# Patient Record
Sex: FEMALE | Race: ASIAN | NOT HISPANIC OR LATINO | Employment: PART TIME | URBAN - METROPOLITAN AREA
[De-identification: names, ages, dates, MRNs, and addresses within clinical notes are randomized per-mention and may not be internally consistent; named-entity substitution may affect disease eponyms.]

---

## 2017-03-03 ENCOUNTER — COMMUNICATION - HEALTHEAST (OUTPATIENT)
Dept: HEALTH INFORMATION MANAGEMENT | Facility: CLINIC | Age: 37
End: 2017-03-03

## 2021-06-01 ENCOUNTER — RECORDS - HEALTHEAST (OUTPATIENT)
Dept: ADMINISTRATIVE | Facility: CLINIC | Age: 41
End: 2021-06-01

## 2021-06-02 ENCOUNTER — RECORDS - HEALTHEAST (OUTPATIENT)
Dept: ADMINISTRATIVE | Facility: CLINIC | Age: 41
End: 2021-06-02

## 2021-11-04 NOTE — TELEPHONE ENCOUNTER
FUTURE VISIT INFORMATION      FUTURE VISIT INFORMATION:    Date: 12/3/21    Time: 8:30 AM    Location: [facility]  REFERRAL INFORMATION:    Referring provider: Dr. Gale Kim    Referring providers clinic: [facility] [place]    Reason for visit/diagnosis: Osteoma of the paranasal sinuses    RECORDS REQUESTED FROM:       Clinic name Comments Records Status Imaging Status   [facility] [place] 11/4/21 - ENT OV with Dr. Kim Care Everywhere    [facility] [place] 10/6/21 - PCC TV with Dr. Leone Care Everywhere    [facility] - Imaging 10/18/21 - CT Head Sinus Care Everywhere 11/4 Req - In PACs                       * 11/4/21 2:47 PM Faxed req to [facility] for images to be pushed to [employer] PACs. - Sidra

## 2021-11-05 ENCOUNTER — TRANSCRIBE ORDERS (OUTPATIENT)
Dept: OTHER | Age: 41
End: 2021-11-05

## 2021-11-05 DIAGNOSIS — D16.4 OSTEOMA OF PARANASAL SINUS: Primary | ICD-10-CM

## 2021-11-30 NOTE — PATIENT INSTRUCTIONS
"1. You were seen in the clinic today by Dr. Garcia. If you have any questions or concerns after your appointment, please call the clinic at [phone]. Press \"1\" for scheduling, press \"3\" for nurse advice.    2.   The following has been recommended for you based upon your appointment today:   -Schedule for surgery for the [place] for osteoma.    3.   Plan to return the clinic after surgery.       Michelle Griffin LPN  [facility]  Department of Otolaryngology  [phone]    Surgery Teaching    1. Someone from our scheduling department will call you within the next few days to get you scheduled with your provider for surgery. If no one has called you in one week, please notify us.    2. You must have a physical exam (called  history and physical ) within 30 days of surgery. You may complete this with your primary care provider.   A. If your provider is outside of the [facility] please have them complete the preoperative forms provided to you in the surgery packet you will be mailed and be sure to have your provider fax them to the appropriate location prior to surgery. For surgery at the [facility] the fax number [phone]. For surgery at the [place] the fax number is [phone].  B. In some cases we may have you see our Preoperative Assessment Center. If we have expressed this to you, our  will set up your appointment with them when they call to set up your surgery.    3. Complete a COVID test 4 days prior to surgery. You will need to have this done regardless of whether you have had the COVID vaccine. If you have the test performed at a clinic outside of the network, you will need to have the test results faxed to us.    4. For same-day surgery, you must arrange for an adult to take you home from the Center. An adult must stay with you for the first 24 hours after surgery. You cannot drive for 24 hours.     5. Ask your doctor what medicines are safe before surgery. For over the counter " medications and supplements it is advised that you do NOT TAKE MOTRIN, IBUPROFEN, ASPIRIN, ALEVE, GARLIC SUPPLEMENTS or FISH OIL x 7 days prior to surgery (to prevent excess bleeding and bruising at time of surgery). If your provider advises you to take any medication the morning of surgery you should take this with a sip of water.    6. A few days prior to surgery a nurse will call you to review your health history and instructions for before and after surgery. They will give you your final arrival time based upon your scheduled arrival time for surgery.    7. Call the surgical team if there's any change in your health prior to surgery. Things you should call for include but are not limited to signs of a cold or the flu (sore throat, runny nose, cough, rash, fever). Other things to notify them for is for any open wounds (cuts, scrapes, scratches) near to the surgery site.    8. If you drink alcohol, stop drinking alcohol at least 24 hours before surgery.    9. If you smoke, stop or at least cut down on smoking 24 hours before surgery.    10.Take a bath or shower the night before and the morning of surgery (as told by your surgeon). Use an antiseptic soap. If your doctor does not give you special soap, buy Hibiclens or Francie-Stat at the drug store or ask the pharmacist to suggest a brand. You will wash with this from the neck down, washing your hair and face as you would normally.   A. When you are done with your shower please be sure to use clean towels to dry with, have clean linens on your bed, and put on clean clothes each time.   B. DO NOT put on lotion, powder, perfume, deodorant or make-up after bathing.    11. You can eat a normal meal the night before surgery. Do not eat any solid foods or drink any milk products for 8 hours before surgery.     12. You may drink clear liquids until 2 hours before surgery. Clear liquids include water, Gatorade, apple juice and liquids you can see through.    13. No eating or  drinking 2 hours prior to surgery until after surgery. Your post op team will review any diet limitations you might have and when you can start eating and drinking again after surgery.      If you have any questions before or after surgery please call:    VIOLA Rincon  [facility]  Department of Otolaryngology  [phone]

## 2021-12-03 ENCOUNTER — OFFICE VISIT (OUTPATIENT)
Dept: OTOLARYNGOLOGY | Facility: CLINIC | Age: 41
End: 2021-12-03
Payer: COMMERCIAL

## 2021-12-03 ENCOUNTER — PRE VISIT (OUTPATIENT)
Dept: OTOLARYNGOLOGY | Facility: CLINIC | Age: 41
End: 2021-12-03

## 2021-12-03 VITALS — BODY MASS INDEX: 33.49 KG/M2 | WEIGHT: 182 LBS | HEART RATE: 79 BPM | OXYGEN SATURATION: 96 % | HEIGHT: 62 IN

## 2021-12-03 DIAGNOSIS — J32.1 CHRONIC FRONTAL SINUSITIS: ICD-10-CM

## 2021-12-03 DIAGNOSIS — G50.1 ATYPICAL FACIAL PAIN: ICD-10-CM

## 2021-12-03 DIAGNOSIS — D16.4 OSTEOMA OF PARANASAL SINUS: Primary | ICD-10-CM

## 2021-12-03 PROCEDURE — 31231 NASAL ENDOSCOPY DX: CPT | Performed by: OTOLARYNGOLOGY

## 2021-12-03 PROCEDURE — 99204 OFFICE O/P NEW MOD 45 MIN: CPT | Mod: 25 | Performed by: OTOLARYNGOLOGY

## 2021-12-03 RX ORDER — TRIAMCINOLONE ACETONIDE 1 MG/G
CREAM TOPICAL DAILY PRN
COMMUNITY
Start: 2021-10-06

## 2021-12-03 RX ORDER — ESCITALOPRAM OXALATE 20 MG/1
TABLET ORAL EVERY MORNING
COMMUNITY
Start: 2021-10-06

## 2021-12-03 RX ORDER — LISINOPRIL 20 MG/1
20 TABLET ORAL EVERY MORNING
COMMUNITY
Start: 2021-10-06

## 2021-12-03 RX ORDER — ALBUTEROL SULFATE 90 UG/1
1-2 AEROSOL, METERED RESPIRATORY (INHALATION) EVERY 4 HOURS PRN
COMMUNITY
Start: 2021-01-28

## 2021-12-03 RX ORDER — ONDANSETRON 4 MG/1
TABLET, FILM COATED ORAL EVERY 8 HOURS PRN
COMMUNITY

## 2021-12-03 RX ORDER — CLOTRIMAZOLE 1 %
CREAM (GRAM) TOPICAL DAILY PRN
COMMUNITY
Start: 2021-10-06

## 2021-12-03 RX ORDER — GLIPIZIDE 10 MG/1
10 TABLET, FILM COATED, EXTENDED RELEASE ORAL EVERY MORNING
COMMUNITY
Start: 2021-10-06

## 2021-12-03 RX ORDER — FLUTICASONE PROPIONATE 50 MCG
1 SPRAY, SUSPENSION (ML) NASAL DAILY PRN
COMMUNITY

## 2021-12-03 ASSESSMENT — MIFFLIN-ST. JEOR: SCORE: 1443.8

## 2021-12-03 ASSESSMENT — PAIN SCALES - GENERAL: PAINLEVEL: NO PAIN (0)

## 2021-12-03 NOTE — LETTER
12/3/2021       RE: Shauna Mobley  [address] [place] [address]     Dear Colleague,    Thank you for referring your patient, Shauna Mobley, to the [facility] [place] [facility]. Please see a copy of my visit note below.      [facility]  New Patient Visit      Encounter date:   December 3, 2021    Referring Provider:   Dr. Gale Alexis    Chief Complaint: osteoma of the paranasal sinuses    History of Present Illness:   Shauna Mobley is a 41 year old female who presents for consultation regarding osteoma of the paranasal sinuses. The patient initially had symptoms of sharp facial pain on her face when air would hit her face in August 2020. In September 2021, she reported history of dizziness, ear pain, congestion, and pressure behind her eyes. The patient presented to her PCP with ongoing sinus pressure somewhat improved with antibiotics and not improved with sudafed.. She was referred to Dr. Kim for ongoing symptoms. Dr. Kim ordered a CT of the sinuses, and referred the patient here for further evaluation after a 11 x 9 x 12 mm osteoma at the inferior right frontal sinus and right frontal recess was revealed.    Today, the patient reports pressure behind both of her eyes, right worse than left. She describes the pain to be dull, and feels sharp when cold air hits her head. She found this out when she had a fan on her while working. Mrs. Mobley endorses history of eczema all over her body, and has some on her right eyelid. The patient also endorses half-room-spinning dizziness, which feels different than dizziness she gets with blood pressure issues. 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 She endorses history of trach tube placement in 2009 after diagnosed with H1N1.    Sino-Nasal Outcome Test (SNOT - 22)  1. Need to Blow Nose: (P) Very mild  2. Nasal Blockage: (P) Mild or  slight  3. Sneezing: (P) Very mild  4. Runny Nose: (P) Very mild  5. Cough: (P) Very mild  6. Post-nasal discharge: (P) Mild or slight  7. Thick nasal discharge: (P) None  8. Ear fullness: (P) Very mild  9. Dizziness: (P) Mild or slight  10. Ear Pain: (P) None  11. Facial pain/pressure: (P) Mild or slight  12. Decreased Sense of Smell/Taste: (P) None  13. Difficulty falling asleep: (P) Very mild  14. Wake up at night: (P) Moderate  15. Lack of a good night's sleep: (P) Mild or slight  16. Wake up tired: (P) Moderate  17. Fatigue: (P) Mild or slight,Moderate  18. Reduced Productivity: (P) Mild or slight,Moderate  19. Reduced Concentration: (P) Very mild  20. Frustrated/restless/irritable: (P) Very mild  21. Sad: (P) Very mild  22. Embarrassed: (P) None  Total Score: (P) 29    [place] Operative History:  n/a    Review of systems: A 14-point review of systems has been conducted and was negative for any notable symptoms, except as dictated in the history of present illness.     Medical History:  No past medical history on file.     Surgical History:   Past Surgical History:   Procedure Laterality Date     CORONARY STENT PLACEMENT  2001    Age 21.  3-4 stents placed.     GASTRECTOMY LAPAROSCOPIC SLEEVE  7/2013     IR MISCELLANEOUS PROCEDURE  11/12/2009      Family History:  Family History   Problem Relation Age of Onset     Diabetes Father      Diabetes Sister      Hypertension Father      Anxiety Disorder Mother      Diabetes Mother      Hypertension Brother      Hypertension Brother      Hypertension Sister      Heart Failure Brother      Cerebrovascular Disease Sister       Social History:   Social History     Socioeconomic History     Marital status:      Spouse name: Not on file     Number of children: Not on file     Years of education: Not on file     Highest education level: Not on file   Occupational History     Not on file   Tobacco Use     Smoking status: Not on file     Smokeless tobacco: Not on file  "  Substance and Sexual Activity     Alcohol use: Not on file     Drug use: Not on file     Sexual activity: Not on file   Other Topics Concern     Not on file   Social History Narrative     Not on file     Social Determinants of Health     Financial Resource Strain: Not on file   Food Insecurity: Not on file   Transportation Needs: Not on file   Physical Activity: Not on file   Stress: Not on file   Social Connections: Not on file   Intimate Partner Violence: Not on file   Housing Stability: Not on file      Physical Exam:  Vital signs: Pulse 79   Ht 1.575 m (5' 2\")   Wt 82.6 kg (182 lb)   SpO2 96%   BMI 33.29 kg/m     General Appearance: No acute distress, appropriate demeanor, conversant  Eyes: moist conjunctivae; EOMI; pupils symmetric; visual acuity grossly intact; no proptosis  Head: normocephalic; overall symmetric appearance without deformity  Face: overall symmetric without deformity; HB I/VI  Ears: Normal appearance of external ear; right ear has cerumen buildup, otherwise, external meatus normal in appearance; TMs intact without perforation bilaterally;   Nose: No external deformity  Oral Cavity/oropharynx: Normal appearance of mucosa; tongue midline; no mass or lesions; tonsils surgically absent ; oropharynx without obvious mucosal abnormality  Neck: no palpable lymphadenopathy; thyroid without palpable nodules  Lungs: symmetric chest rise; no wheezing  CV: Good distal perfusion; normal hear rate  Extremities: No deformity  Neurologic Exam: Cranial nerves II-XII are grossly intact; no focal deficit    Procedure Note  Procedure performed: Rigid nasal endoscopy  Indication: To evaluate for sinonasal pathology not visualized on routine anterior rhinoscopy  Anesthesia: 4% topical lidocaine with 0.05% oxymetazoline  Description of procedure: A 30 degree, 3 mm rigid endoscope was inserted into bilateral nasal cavities and the nasal valves, nasal cavity, middle meatus, sphenoethmoid recess, and nasopharynx " were thoroughly evaluated for evidence of obstruction, edema, purulence, polyps and/or mass/lesion.     [place] Endoscopic Scoring System  Endoscopic observation Right Left   Polyps in middle meatus (0 = absent, 1 = restricted to middle meatus, 2 = Beyond middle meatus) 0 0   Discharge (0 = absent, 1 = thin and clear, 2 = thick, purulent) 0 0   Edema (0 = absent, 1 = mild-moderate, 2 = moderate-severe) 0 0   Crusting (0 = absent, 1 = mild-moderate, 2 = moderate-severe) 0 0   Scarring (0= absent, 1 = mild-moderate, 2 = moderate-severe) 0 0   Total 0 0     Findings  RT: MM and SER clear. No evidence of sinusitis.  LT: MM and SER clear. No evidence of sinusitis.    Nasopharynx clear.    The patient tolerated the procedure well without complication.     Laboratory Review:  n/a    Imaging Review:  CT HEAD SINUS LANDMARX WO:  (10/18/2021)  IMPRESSION:   1.  11 x 9 x 12 mm osteoma at the inferior right frontal sinus and right frontal recess completely obscures the right frontal recess. With this, there is complete opacification of the right frontal sinus.    *I have personally reviewed these images and agree with the radiologist's impression*    Pathology Review:  n/a    Assessment/Medical Decision Making:  Osteoma at the inferior right frontal sinus and right frontal recess     We discussed how CSF leak, mucocele, chronic frontal sinusitis, albiet rare, can occur from untreated sinonasal osteomas.     We discussed treatment options, such as observation versus surgical removal. We discussed that this could be surgically removed completely endonasally. We discussed that Draf 3 approach would be required. I explained the risks and benefits of both options. Specific surgical risks include CSF leak, incomplete tumor removal, orbital injury, external changes in appearance of nose, scarring, need for additional procedures, among other risks. She is interested and amenable to surgical resection.     Plan:  Will schedule  surgery - endoscopic endonasal resection of extradural tumor, image-guided  PAC pre-op  RTC post-op    Bry Garcia MD    [facility]  Rhinology  Endoscopic Skull Base Surgery  [facility]  Department of Otolaryngology - Head & Neck Surgery    Scribe Disclosure:  I, Michelle Randle, am serving as a scribe to document services personally performed by Bry Garcia MD at this visit, based upon the provider's statements to me. All documentation has been reviewed by the aforementioned provider prior to being entered into the official medical record.

## 2021-12-03 NOTE — PROGRESS NOTES
[facility]  New Patient Visit      Encounter date:   December 3, 2021    [facility]    Chief Complaint: osteoma of the paranasal sinuses    History of Present Illness:   Shauna Mobley is a 41 year old female who presents for consultation regarding osteoma of the paranasal sinuses. The patient initially had symptoms of sharp facial pain on her face when air would hit her face in August 2020. In September 2021, she reported history of dizziness, ear pain, congestion, and pressure behind her eyes. The patient presented to her PCP with ongoing sinus pressure somewhat improved with antibiotics and not improved with sudafed.. She was referred to Dr. Kim for ongoing symptoms. Dr. Kim ordered a CT of the sinuses, and referred the patient here for further evaluation after a 11 x 9 x 12 mm osteoma at the inferior right frontal sinus and right frontal recess was revealed.    Today, the patient reports pressure behind both of her eyes, right worse than left. She describes the pain to be dull, and feels sharp when cold air hits her head. She found this out when she had a fan on her while working. Mrs. Mobley endorses history of eczema all over her body, and has some on her right eyelid. The patient also endorses half-room-spinning dizziness, which feels different than dizziness she gets with blood pressure issues. 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 She endorses history of trach tube placement in 2009 after diagnosed with H1N1.    Sino-Nasal Outcome Test (SNOT - 22)  1. Need to Blow Nose: (P) Very mild  2. Nasal Blockage: (P) Mild or slight  3. Sneezing: (P) Very mild  4. Runny Nose: (P) Very mild  5. Cough: (P) Very mild  6. Post-nasal discharge: (P) Mild or slight  7. Thick nasal discharge: (P) None  8. Ear fullness: (P) Very mild  9. Dizziness: (P) Mild or slight  10. Ear Pain: (P) None  11. Facial pain/pressure: (P) Mild or slight  12.  Decreased Sense of Smell/Taste: (P) None  13. Difficulty falling asleep: (P) Very mild  14. Wake up at night: (P) Moderate  15. Lack of a good night's sleep: (P) Mild or slight  16. Wake up tired: (P) Moderate  17. Fatigue: (P) Mild or slight,Moderate  18. Reduced Productivity: (P) Mild or slight,Moderate  19. Reduced Concentration: (P) Very mild  20. Frustrated/restless/irritable: (P) Very mild  21. Sad: (P) Very mild  22. Embarrassed: (P) None  Total Score: (P) 29    [place] Operative History:  n/a    Review of systems: A 14-point review of systems has been conducted and was negative for any notable symptoms, except as dictated in the history of present illness.     Medical History:  No past medical history on file.     Surgical History:   Past Surgical History:   Procedure Laterality Date     CORONARY STENT PLACEMENT  2001    Age 21.  3-4 stents placed.     GASTRECTOMY LAPAROSCOPIC SLEEVE  7/2013     IR MISCELLANEOUS PROCEDURE  11/12/2009      Family History:  Family History   Problem Relation Age of Onset     Diabetes Father      Diabetes Sister      Hypertension Father      Anxiety Disorder Mother      Diabetes Mother      Hypertension Brother      Hypertension Brother      Hypertension Sister      Heart Failure Brother      Cerebrovascular Disease Sister       Social History:   Social History     Socioeconomic History     Marital status:      Spouse name: Not on file     Number of children: Not on file     Years of education: Not on file     Highest education level: Not on file   Occupational History     Not on file   Tobacco Use     Smoking status: Not on file     Smokeless tobacco: Not on file   Substance and Sexual Activity     Alcohol use: Not on file     Drug use: Not on file     Sexual activity: Not on file   Other Topics Concern     Not on file   Social History Narrative     Not on file     Social Determinants of Health     Financial Resource Strain: Not on file   Food Insecurity: Not on file  "  Transportation Needs: Not on file   Physical Activity: Not on file   Stress: Not on file   Social Connections: Not on file   Intimate Partner Violence: Not on file   Housing Stability: Not on file      Physical Exam:  Vital signs: Pulse 79   Ht 1.575 m (5' 2\")   Wt 82.6 kg (182 lb)   SpO2 96%   BMI 33.29 kg/m     General Appearance: No acute distress, appropriate demeanor, conversant  Eyes: moist conjunctivae; EOMI; pupils symmetric; visual acuity grossly intact; no proptosis  Head: normocephalic; overall symmetric appearance without deformity  Face: overall symmetric without deformity; HB I/VI  Ears: Normal appearance of external ear; right ear has cerumen buildup, otherwise, external meatus normal in appearance; TMs intact without perforation bilaterally;   Nose: No external deformity  Oral Cavity/oropharynx: Normal appearance of mucosa; tongue midline; no mass or lesions; tonsils surgically absent ; oropharynx without obvious mucosal abnormality  Neck: no palpable lymphadenopathy; thyroid without palpable nodules  Lungs: symmetric chest rise; no wheezing  CV: Good distal perfusion; normal hear rate  Extremities: No deformity  Neurologic Exam: Cranial nerves II-XII are grossly intact; no focal deficit    Procedure Note  Procedure performed: Rigid nasal endoscopy  Indication: To evaluate for sinonasal pathology not visualized on routine anterior rhinoscopy  Anesthesia: 4% topical lidocaine with 0.05% oxymetazoline  Description of procedure: A 30 degree, 3 mm rigid endoscope was inserted into bilateral nasal cavities and the nasal valves, nasal cavity, middle meatus, sphenoethmoid recess, and nasopharynx were thoroughly evaluated for evidence of obstruction, edema, purulence, polyps and/or mass/lesion.     [place] Endoscopic Scoring System  Endoscopic observation Right Left   Polyps in middle meatus (0 = absent, 1 = restricted to middle meatus, 2 = Beyond middle meatus) 0 0   Discharge (0 = absent, 1 = " thin and clear, 2 = thick, purulent) 0 0   Edema (0 = absent, 1 = mild-moderate, 2 = moderate-severe) 0 0   Crusting (0 = absent, 1 = mild-moderate, 2 = moderate-severe) 0 0   Scarring (0= absent, 1 = mild-moderate, 2 = moderate-severe) 0 0   Total 0 0     Findings  RT: MM and SER clear. No evidence of sinusitis.  LT: MM and SER clear. No evidence of sinusitis.    Nasopharynx clear.    The patient tolerated the procedure well without complication.     Laboratory Review:  n/a    Imaging Review:  CT HEAD SINUS LANDMARX WO:  (10/18/2021)  IMPRESSION:   1.  11 x 9 x 12 mm osteoma at the inferior right frontal sinus and right frontal recess completely obscures the right frontal recess. With this, there is complete opacification of the right frontal sinus.    *I have personally reviewed these images and agree with the radiologist's impression*    Pathology Review:  n/a    Assessment/Medical Decision Making:  Osteoma at the inferior right frontal sinus and right frontal recess     We discussed how CSF leak, mucocele, chronic frontal sinusitis, albiet rare, can occur from untreated sinonasal osteomas.     We discussed treatment options, such as observation versus surgical removal. We discussed that this could be surgically removed completely endonasally. We discussed that Draf 3 approach would be required. I explained the risks and benefits of both options. Specific surgical risks include CSF leak, incomplete tumor removal, orbital injury, external changes in appearance of nose, scarring, need for additional procedures, among other risks. She is interested and amenable to surgical resection.     Plan:  Will schedule surgery - endoscopic endonasal resection of extradural tumor, image-guided  PAC pre-op  RTC post-op    Bry Garcia MD    [facility]  Rhinology  Endoscopic Skull Base Surgery  [facility]  Department of Otolaryngology - Head & Neck Surgery    Scribe  Disclosure:  I, Michelle Randle, am serving as a scribe to document services personally performed by Bry Garcia MD at this visit, based upon the provider's statements to me. All documentation has been reviewed by the aforementioned provider prior to being entered into the official medical record.

## 2021-12-05 ENCOUNTER — HEALTH MAINTENANCE LETTER (OUTPATIENT)
Age: 41
End: 2021-12-05

## 2021-12-08 ENCOUNTER — TELEPHONE (OUTPATIENT)
Dept: OTOLARYNGOLOGY | Facility: CLINIC | Age: 41
End: 2021-12-08
Payer: COMMERCIAL

## 2021-12-08 NOTE — TELEPHONE ENCOUNTER
"Attempted to contact patient via phone call to schedule surgery with Dr. Garcia  \"we are sorry, your call cannot be completed at this time. Please hang up and try your call again later\"     Lita Melton on 12/8/2021 at 1:46 PM    "

## 2021-12-20 NOTE — TELEPHONE ENCOUNTER
Left message regarding scheduling surgery with Dr. Garcia. Writer left call back number on the patients voicemail.      Lita Melton on 12/20/2021 at 2:27 PM   P: [phone]

## 2021-12-21 NOTE — TELEPHONE ENCOUNTER
FUTURE VISIT INFORMATION      SURGERY INFORMATION:    Date: 22    Location: uu or    Surgeon:  Bry Garcia MD    Anesthesia Type:  general    Procedure: ENDOSCOPIC, ENDONASAL IMAGE-GUIDED RESECTION OF SKULL BASE OSTEOMA    Consult: ov 12/3/21    RECORDS REQUESTED FROM:       Primary Care Provider: Enriqueta Leone MD- [facility]    Pertinent Medical History: hypertension, ASCVD     Most recent EKG+ Tracin20- [facility]    Most recent ECHO: 20- [facility]

## 2021-12-21 NOTE — TELEPHONE ENCOUNTER
Spoke to patient regarding scheduling surgery with Dr. Garcia. Informed patient first available at the [facility] is 1/25/22  - patient states this will work well as she is off from work.     Patient scheduled PAC virtual visit [facility]     Aware that instructions will be given at Pre-op appointment with PAC.     She has no further questions or concerns at this time.     Surgical packet mailed and patient call back with any questions or concerns.

## 2021-12-26 DIAGNOSIS — Z11.59 ENCOUNTER FOR SCREENING FOR OTHER VIRAL DISEASES: ICD-10-CM

## 2022-01-10 ENCOUNTER — MYC MEDICAL ADVICE (OUTPATIENT)
Dept: SURGERY | Facility: CLINIC | Age: 42
End: 2022-01-10

## 2022-01-10 ENCOUNTER — ANESTHESIA EVENT (OUTPATIENT)
Dept: SURGERY | Facility: CLINIC | Age: 42
End: 2022-01-10

## 2022-01-10 ENCOUNTER — PRE VISIT (OUTPATIENT)
Dept: SURGERY | Facility: CLINIC | Age: 42
End: 2022-01-10

## 2022-01-10 ENCOUNTER — VIRTUAL VISIT (OUTPATIENT)
Dept: SURGERY | Facility: CLINIC | Age: 42
End: 2022-01-10
Payer: COMMERCIAL

## 2022-01-10 ENCOUNTER — TELEPHONE (OUTPATIENT)
Dept: SURGERY | Facility: CLINIC | Age: 42
End: 2022-01-10

## 2022-01-10 DIAGNOSIS — Z01.818 PRE-OP EVALUATION: Primary | ICD-10-CM

## 2022-01-10 PROCEDURE — 99204 OFFICE O/P NEW MOD 45 MIN: CPT | Mod: 95 | Performed by: PHYSICIAN ASSISTANT

## 2022-01-10 RX ORDER — LORATADINE 10 MG/1
10 TABLET ORAL EVERY MORNING
COMMUNITY

## 2022-01-10 RX ORDER — MULTIPLE VITAMINS W/ MINERALS TAB 9MG-400MCG
1 TAB ORAL EVERY MORNING
COMMUNITY

## 2022-01-10 ASSESSMENT — PAIN SCALES - GENERAL: PAINLEVEL: NO PAIN (0)

## 2022-01-10 ASSESSMENT — LIFESTYLE VARIABLES: TOBACCO_USE: 0

## 2022-01-10 NOTE — PATIENT INSTRUCTIONS
Preparing for Your Surgery      Name:  Shauna Mobley   MRN:  [id number]   :  1980   Today's Date:  1/10/2022       Arriving for surgery:  Surgery date:  22  Arrival time:  6AM    Restrictions due to COVID 19:       One visitor is allowed in the Pre Op area.       When you go into surgery, one visitor is allowed to wait in the Surgery Waiting Room       (provided there is enough space to social distance).         In hospital patients are allowed 1 visitor per day       The visitor may change daily     Visiting Hours: 8 am - 8:30 pm   No ill visitors.   All visitors must wear face mask.    [employer] parking is available for anyone with mobility limitations or disabilities.  ([facility]  24 hours/ 7 days a week; [employer]  7 am- 3:30 pm, Mon- Fri)    Please come to:     [facility] [address]  [address] [place] [address]    -   Parking is available in the Patient Visitor Ramp on [facility].     -   When entering the hospital you will be asked COVID screening questions, you will then be directed to Registration.  Registration will direct you to the 3rd floor Surgery waiting room.     -   Please ask if you need an escort or a wheelchair to the Surgery Waiting Room.  Preop number- [phone]?     What can I eat or drink?  -  You may eat and drink normally for up to 8 hours before your surgery. (Until Midnight)  -  You may have clear liquids until 2 hours before surgery. (Until 22, 6AM)    Examples of clear liquids:  Water  Clear broth  Juices (apple, white grape, white cranberry  and cider) without pulp  Noncarbonated, powder based beverages  (lemonade and Leonel-Aid)  Sodas (Sprite, 7-Up, ginger ale and seltzer)  Coffee or tea (without milk or cream)  Gatorade    -  No Alcohol for at least 24 hours before surgery     Which medicines can I take?    Hold Aspirin for 7 days before surgery.   Hold Multivitamins for 7 days before  surgery.  Hold Supplements for 7 days before surgery.  Hold Ibuprofen (Advil, Motrin) for 1 day before surgery--unless otherwise directed by surgeon.  Hold Naproxen (Aleve) for 4 days before surgery.    -  DO NOT take these medications the day of surgery:    Glipizide(Glucotrol)   Lisinopril    -  PLEASE TAKE these medications the day of surgery:    Albuterol inhaler as needed, bring the day of surgery    Flonase as needed   Ondansetron as needed    Atorvastatin(Lipitor)   Escitalopram(Lexapro)    Loratadine(Claritin)    How do I prepare myself?  - Please take 2 showers before surgery using Scrubcare or Hibiclens soap.    Use this soap only from the neck to your toes.     Leave the soap on your skin for one minute--then rinse thoroughly.      You may use your own shampoo and conditioner; no other hair products.   - Please remove all jewelry and body piercings.  - No lotions, deodorants or fragrance.  - No makeup or fingernail polish.   - Bring your ID and insurance card.    -If you have a Deep Brain Stimulator, Spinal Cord Stimulator or any neuro stimulator device---you must bring the remote control to the hospital     - All patients are required to have a Covid-19 test within 4 days of surgery/procedure.      -Patients will be contacted by the [facility] scheduling team within 1 week of surgery to make an appointment.      - Patients may call the Scheduling team at [phone] if they have not been scheduled within 4 days of  surgery.      ALL PATIENTS GOING HOME THE SAME DAY OF SURGERY ARE REQUIRED TO HAVE A RESPONSIBLE ADULT TO DRIVE AND BE IN ATTENDANCE WITH THEM FOR 24 HOURS FOLLOWING SURGERY.      Questions or Concerns:    - For any questions regarding the day of surgery or your hospital stay, please contact the Pre Admission Nursing Office at [phone].       - If you have health changes between today and your surgery please call your surgeon.       For questions after surgery please call your  surgeons office.

## 2022-01-10 NOTE — PROGRESS NOTES
Shauna is a 41 year old who is being evaluated via a billable video visit.      How would you like to obtain your AVS? MyChart  If the video visit is dropped, the invitation should be resent by: Text to cell phone: [phone]    HPI         Review of Systems         Objective    Vitals - Patient Reported  Pain Score: No Pain (0)        Physical Exam

## 2022-01-10 NOTE — ANESTHESIA PREPROCEDURE EVALUATION
Anesthesia Pre-Procedure Evaluation    Patient: Shauna Mobley   MRN: [id number] : 1980        Preoperative Diagnosis: * No surgery found *    Procedure :   Video Visit       Past Medical History:   Diagnosis Date     Anxiety      Diabetes mellitus, type 2 (H)      Dyslipidemia      HTN (hypertension)       Past Surgical History:   Procedure Laterality Date     CORONARY STENT PLACEMENT      Age 21.  3-4 stents placed.     GASTRECTOMY LAPAROSCOPIC SLEEVE  2013     IR MISCELLANEOUS PROCEDURE  2009      No Known Allergies   Social History     Tobacco Use     Smoking status: Light Tobacco Smoker     Smokeless tobacco: Never Used     Tobacco comment: Vape User   Substance Use Topics     Alcohol use: Not on file      Wt Readings from Last 1 Encounters:   21 82.6 kg (182 lb)        Anesthesia Evaluation   Pt has had prior anesthetic.     No history of anesthetic complications       ROS/MED HX  ENT/Pulmonary: Comment: H/o trach in  due to H1N1, for a few days    COVID 10/2020    (+) sleep apnea, doesn't use CPAP, Intermittent, asthma Treatment: Inhaler prn,   (-) tobacco use   Neurologic: Comment: Vein occlusion of right eye    Osteoma of paranasal sinus      Cardiovascular:     (+) Dyslipidemia hypertension----stent-, . Taking blood thinners Previous cardiac testing   Echo: Date: 2020 Results:  Final Conclusion    1. Normal left ventricular chamber size and systolic function. Calculated left ventricular   ejection fraction is 61 %.    2. Regional wall motion abnormalities are present: the distal anterior, anteroseptal, and   apical walls are hypokinetic.     3. Normal right ventricular size and systolic function.     4. No significant valvular heart disease.        There were no prior studies available for comparison.     Stress Test:  Date: Results:    ECG Reviewed:  Date: 2020 Results:  SR, previous infarct  Cath:  Date: Results:      METS/Exercise Tolerance: 3 - Able to walk 1-2  blocks without stopping Comment: Cleaning house, can ascend a flight of stairs and walk a block or two without needing to take a break. Gets some CASTILLO   Hematologic:     (+) history of blood transfusion, no previous transfusion reaction,  (-) history of blood clots   Musculoskeletal:  - neg musculoskeletal ROS     GI/Hepatic: Comment: H/o gastric sleeve      Renal/Genitourinary:  - neg Renal ROS     Endo:     (+) type II DM, Last HgA1c: 8.9, date: 10/11/21, Not using insulin, Obesity (BMI 33),  (-) chronic steroid usage   Psychiatric/Substance Use:     (+) psychiatric history anxiety     Infectious Disease:  - neg infectious disease ROS     Malignancy:  - neg malignancy ROS     Other:               OUTSIDE LABS:  CBC: No results found for: WBC, HGB, HCT, PLT  BMP: No results found for: NA, POTASSIUM, CHLORIDE, CO2, BUN, CR, GLC  COAGS: No results found for: PTT, INR, FIBR  POC: No results found for: BGM, HCG, HCGS  HEPATIC:   Lab Results   Component Value Date    PROTTOTAL 7.6 06/14/2011     OTHER:   Lab Results   Component Value Date    A1C 6.2 (H) 05/11/2015             PAC Discussion and Assessment    ASA Classification: 3  Case is suitable for: [facility]  Anesthetic techniques and relevant risks discussed: GA                  PAC Resident/NP Anesthesia Assessment: Shauna Mobley is a 41 year old female scheduled for ENDOSCOPIC, ENDONASAL IMAGE-GUIDED RESECTION OF SKULL BASE OSTEOMA on 1/25/22 by Dr. Garcia in treatment of osteoma of paranasal sinus.  PAC referral for risk assessment and optimization for anesthesia with comorbid conditions of hypertension, dyslipidemia, diabetes, obesity, anxiety, s/p gastric sleeve:      Pre-operative considerations:   1.  Cardiac:  Functional status- METS 3. Denies CP, endorses some CASTILLO with activity that is longstanding. Intermediate risk surgery with 0.9% (RCRI #) risk of major adverse cardiac event.   ~hypertension using lisinopril   ~dyslipidemia using Lipitor   ~h/o CAD s/p  stenting in 2002 and 2003. This was completed at an outside facility and no records available. She then had some CP with COVID infection in 2020. She was seen by cardiology through [facility] and completed Echo that showed EF 61%, no regional wall abnormalities and hypokinesis of distal anterior, anteroseptal, and apical walls. She denies any new/changes in cardiac symptoms since that time- she continues to notice CASTILLO. Recommend she see cardiology for optimization prior to her upcoming surgery- referral placed.      2.  Pulm:   VINICIUS risk: intermediate  ~non smoker   ~h/o trach in 2009 with H1N1 virus      3.  GI:  Risk of PONV score = 3.  If > 2, anti-emetic intervention recommended.      4. Endo: diabetes using glipizide. A1c was 8.9 10/11/22. Follows with PCP. Will update A1c this week. Recommend she discuss diabetes control with her PCP with goal of A1c <8 for surgery.   ~BMI 33. She is s/p gastric sleeve      5. psych: anxiety using Lexapro      6. ENT: osteoma of paranasal sinus with the above procedure now planned       VTE risk: 0.26%     Patient is not yet optimized for the above procedure. Recommend cardiac optimization with h/o stents at age 22 with current CASTILLO. Also recommend diabetes control (A1c was 8.9 10/2021)- recommend she discuss with PCP with goal A1c <8 prior to surgery. I will alert surgeon to our recommendations.      Patient discussed with Dr. Carrillo    **Physical exam and vital signs not completed today as this visit was scheduled as a virtual visit during Covid 19 pandemic. Physical exam should be completed the DOS in pre-op**     Final plan per anesthesiologist on day of surgery.     Addendum: patient was seen by cardiology with recommendation to complete stress MRI prior to surgery. MRI was negative for ischemia,and okay to proceed with surgery per cardiology.   She also followed up with her PCP 1/13/22 for diabetes control repeat A1c was 8.9 and she has since started Trulicity. I will send a  message to Dr. Garcia to see if he is comfortable proceeding with current A1c and diabetes plan.      **For further details of assessment, testing, and physical exam please see H and P completed on same date.         BEL Thomas PA-C

## 2022-01-10 NOTE — H&P
Pre-Operative H & P     CC:  Preoperative exam to assess for increased cardiopulmonary risk while undergoing surgery and anesthesia.    Date of Encounter: 1/10/2022  Primary Care Physician:  Enriqueta Leone     Reason for visit:   Encounter Diagnosis   Name Primary?     Pre-op evaluation Yes       HPI  Shauna Mobley is a 41 year old female who presents for pre-operative H & P in preparation for ENDOSCOPIC, ENDONASAL IMAGE-GUIDED RESECTION OF SKULL BASE OSTEOMA with Dr. Garcia on 1/25/22 at [facility]. Patient is being evaluated for comorbid conditions of hypertension, dyslipidemia, diabetes, obesity, anxiety, s/p gastric sleeve      Ms. Mobley has a history of sharp facial pain when she would feel air on her face 8/2020. Then 9/2021, she noticed dizziness, ear pain, congestion, and pressure behind her eyes. She noticed some improvement in symptoms after antibiotic use. CT showed 11 x 9 x 12 mm osteoma at the inferior right frontal sinus and right frontal recess was revealed. She was referred to Dr. Garcia for further evaluation. She continues to have dull pain with sharp pain with air on face. She is now scheduled for the above procedure.      History is obtained from the patient and chart review      Hx of abnormal bleeding or anti-platelet use: ASA 81    Menstrual history: No LMP recorded. Patient has had an ablation.    Prior to Admission Medications  Current Outpatient Medications   Medication Sig Dispense Refill     albuterol (PROAIR HFA/PROVENTIL HFA/VENTOLIN HFA) 108 (90 Base) MCG/ACT inhaler Inhale 1-2 puffs into the lungs every 4 hours as needed        aspirin 81 MG EC tablet Take 81 mg by mouth every morning        atorvastatin (LIPITOR) 10 MG tablet Take 10 mg by mouth every morning        clotrimazole (LOTRIMIN) 1 % external cream Apply topically daily as needed        escitalopram (LEXAPRO) 20 MG tablet every morning        fluticasone (FLONASE) 50 MCG/ACT  nasal spray Spray 1 spray into both nostrils daily as needed        glipiZIDE (GLUCOTROL XL) 10 MG 24 hr tablet Take 10 mg by mouth every morning        lisinopril (ZESTRIL) 20 MG tablet Take 20 mg by mouth every morning        loratadine (CLARITIN) 10 MG tablet Take 10 mg by mouth every morning       multivitamin w/minerals (MULTI-VITAMIN) tablet Take 1 tablet by mouth every morning       ondansetron (ZOFRAN) 4 MG tablet Take by mouth every 8 hours as needed for nausea       triamcinolone (KENALOG) 0.1 % external cream daily as needed          Family History  Family History   Problem Relation Age of Onset     Diabetes Father      Diabetes Sister      Hypertension Father      Anxiety Disorder Mother      Diabetes Mother      Hypertension Brother      Hypertension Brother      Hypertension Sister      Heart Failure Brother      Cerebrovascular Disease Sister        The complete review of systems is negative other than noted in the HPI or here.        Anesthesia Pre-Procedure Evaluation    Patient: Shauna Mobley   MRN: [id number] : 1980        Preoperative Diagnosis: * No surgery found *    Procedure :   Video Visit       Past Medical History:   Diagnosis Date     Anxiety      Diabetes mellitus, type 2 (H)      Dyslipidemia      HTN (hypertension)       Past Surgical History:   Procedure Laterality Date     CORONARY STENT PLACEMENT      Age 21.  3-4 stents placed.     GASTRECTOMY LAPAROSCOPIC SLEEVE  2013     IR MISCELLANEOUS PROCEDURE  2009      No Known Allergies   Social History     Tobacco Use     Smoking status: Light Tobacco Smoker     Smokeless tobacco: Never Used     Tobacco comment: Vape User   Substance Use Topics     Alcohol use: Not on file      Wt Readings from Last 1 Encounters:   21 82.6 kg (182 lb)        Anesthesia Evaluation            ROS/MED HX  ENT/Pulmonary: Comment: H/o trach in    (-) tobacco use   Neurologic:  - neg neurologic ROS     Cardiovascular:     (+)  Dyslipidemia hypertension-----Taking blood thinners Previous cardiac testing   Echo: Date: 12/2020 Results:  Final Conclusion    1. Normal left ventricular chamber size and systolic function. Calculated left ventricular   ejection fraction is 61 %.    2. Regional wall motion abnormalities are present: the distal anterior, anteroseptal, and   apical walls are hypokinetic.     3. Normal right ventricular size and systolic function.     4. No significant valvular heart disease.        There were no prior studies available for comparison.     Stress Test:  Date: Results:    ECG Reviewed:  Date: 11/2020 Results:  SR, previous infarct  Cath:  Date: Results:      METS/Exercise Tolerance:     Hematologic:  - neg hematologic  ROS  (-) history of blood transfusion   Musculoskeletal:  - neg musculoskeletal ROS     GI/Hepatic: Comment: H/o gastric sleeve      Renal/Genitourinary:  - neg Renal ROS     Endo:     (+) type II DM, Last HgA1c: 8.9, date: 10/11/21, Not using insulin, Obesity (BMI 33),  (-) chronic steroid usage   Psychiatric/Substance Use:     (+) psychiatric history anxiety     Infectious Disease:  - neg infectious disease ROS     Malignancy:  - neg malignancy ROS     Other:               OUTSIDE LABS:  CBC: No results found for: WBC, HGB, HCT, PLT  BMP: No results found for: NA, POTASSIUM, CHLORIDE, CO2, BUN, CR, GLC  COAGS: No results found for: PTT, INR, FIBR  POC: No results found for: BGM, HCG, HCGS  HEPATIC:   Lab Results   Component Value Date    PROTTOTAL 7.6 06/14/2011     OTHER:   Lab Results   Component Value Date    A1C 6.2 (H) 05/11/2015       Virtual visit -  No vitals were obtained       Physical Exam  Constitutional: Awake, alert, cooperative, no apparent distress, and appears stated age.  HENT: Normocephalic  Respiratory: non labored breathing   Neurologic: Awake, alert, oriented to name, place and time.   Neuropsychiatric: Calm, cooperative. Normal affect.       PRIOR LABS/DIAGNOSTIC STUDIES:   All  labs and imaging personally reviewed       EKG/ stress test - if available please see in ROS above   No results found.  No flowsheet data found.    The patient's records and results personally reviewed by this provider.     Outside records reviewed from: care everywhere      ASSESSMENT and PLAN    Shauna Mobley is a 41 year old female scheduled for ENDOSCOPIC, ENDONASAL IMAGE-GUIDED RESECTION OF SKULL BASE OSTEOMA on 1/25/22 by Dr. Garcia in treatment of osteoma of paranasal sinus.  PAC referral for risk assessment and optimization for anesthesia with comorbid conditions of hypertension, dyslipidemia, diabetes, obesity, anxiety, s/p gastric sleeve:      Pre-operative considerations:   1.  Cardiac:  Functional status- METS 3. Denies CP, endorses some CASTILLO with activity that is longstanding. Intermediate risk surgery with 0.9% (RCRI #) risk of major adverse cardiac event.   ~hypertension using lisinopril   ~dyslipidemia using Lipitor   ~h/o CAD s/p stenting in 2002 and 2003. This was completed at an outside facility and no records available. She then had some CP with COVID infection in 2020. She was seen by cardiology through [facility] and completed Echo that showed EF 61%, no regional wall abnormalities and hypokinesis of distal anterior, anteroseptal, and apical walls. She denies any new/changes in cardiac symptoms since that time- she continues to notice CASTILLO. Recommend she see cardiology for optimization prior to her upcoming surgery- referral placed.      2.  Pulm:   VINICIUS risk: intermediate  ~non smoker   ~h/o trach in 2009 with H1N1 virus      3.  GI:  Risk of PONV score = 3.  If > 2, anti-emetic intervention recommended.      4. Endo: diabetes using glipizide. A1c was 8.9 10/11/22. Follows with PCP. Will update A1c this week. Recommend she discuss diabetes control with her PCP with goal of A1c <8 for surgery.   ~BMI 33. She is s/p gastric sleeve      5. psych: anxiety using Lexapro      6. ENT: osteoma of paranasal  sinus with the above procedure now planned       VTE risk: 0.26%     Patient is not yet optimized for the above procedure. Recommend cardiac optimization with h/o stents at age 22 with current CASTILLO. Also recommend diabetes control (A1c was 8.9 10/2021)- recommend she discuss with PCP with goal A1c <8 prior to surgery. I will alert surgeon to our recommendations.      Patient discussed with Dr. Carrillo    **Physical exam and vital signs not completed today as this visit was scheduled as a virtual visit during Covid 19 pandemic. Physical exam should be completed the DOS in pre-op**     Final plan per anesthesiologist on day of surgery.     Addendum: patient was seen by cardiology with recommendation to complete stress MRI prior to surgery. MRI was negative for ischemia,and okay to proceed with surgery per cardiology.   She also followed up with her PCP 1/13/22 for diabetes control repeat A1c was 8.9 and she has since started Trulicity. I will send a message to Dr. Garcia to see if he is comfortable proceeding with current A1c and diabetes plan.        ** Patient's visit was done virtually today.  A full physical exam was not completed.  Please refer to the physical examination documented by the anesthesiologist in the anesthesia record on the day of surgery. **     Arrival time, NPO, shower and medication instructions provided by nursing staff today.      Video-Visit Details    Type of service:  Video Visit    Patient verbally consented to video service today: YES      Video Start Time: 0736  Video End Time (time video stopped): 0759    Originating Location (pt. Location): Home    Distant Location (provider location):  home    Mode of Communication:  Video Conference via [employer]      On the day of service:     Prep time: 11 minutes  Visit time: 23 minutes  Documentation time: 21 minutes  ------------------------------------------  Total time: 55 minutes      Serenity Miranda PA-C  Preoperative Assessment  [facility]  Phone: [phone]  Fax: [phone]

## 2022-01-11 ENCOUNTER — MYC MEDICAL ADVICE (OUTPATIENT)
Dept: SURGERY | Facility: CLINIC | Age: 42
End: 2022-01-11
Payer: COMMERCIAL

## 2022-01-12 ENCOUNTER — LAB (OUTPATIENT)
Dept: LAB | Facility: CLINIC | Age: 42
End: 2022-01-12
Payer: COMMERCIAL

## 2022-01-12 DIAGNOSIS — Z01.818 PRE-OP EVALUATION: ICD-10-CM

## 2022-01-12 LAB
ABO/RH(D): NORMAL
ANION GAP SERPL CALCULATED.3IONS-SCNC: 9 MMOL/L (ref 5–18)
ANTIBODY SCREEN: NEGATIVE
BUN SERPL-MCNC: 17 MG/DL (ref 8–22)
CALCIUM SERPL-MCNC: 8.9 MG/DL (ref 8.5–10.5)
CHLORIDE BLD-SCNC: 105 MMOL/L (ref 98–107)
CO2 SERPL-SCNC: 26 MMOL/L (ref 22–31)
CREAT SERPL-MCNC: 0.9 MG/DL (ref 0.6–1.1)
ERYTHROCYTE [DISTWIDTH] IN BLOOD BY AUTOMATED COUNT: 12.4 % (ref 10–15)
GFR SERPL CREATININE-BSD FRML MDRD: 82 ML/MIN/1.73M2
GLUCOSE BLD-MCNC: 155 MG/DL (ref 70–125)
HBA1C MFR BLD: 8.9 % (ref 0–5.6)
HCT VFR BLD AUTO: 40.5 % (ref 35–47)
HGB BLD-MCNC: 13.8 G/DL (ref 11.7–15.7)
MCH RBC QN AUTO: 28.6 PG (ref 26.5–33)
MCHC RBC AUTO-ENTMCNC: 34.1 G/DL (ref 31.5–36.5)
MCV RBC AUTO: 84 FL (ref 78–100)
PLATELET # BLD AUTO: 264 10E3/UL (ref 150–450)
POTASSIUM BLD-SCNC: 3.7 MMOL/L (ref 3.5–5)
RBC # BLD AUTO: 4.83 10E6/UL (ref 3.8–5.2)
SODIUM SERPL-SCNC: 140 MMOL/L (ref 136–145)
SPECIMEN EXPIRATION DATE: NORMAL
WBC # BLD AUTO: 8.4 10E3/UL (ref 4–11)

## 2022-01-12 PROCEDURE — 85027 COMPLETE CBC AUTOMATED: CPT | Performed by: FAMILY MEDICINE

## 2022-01-12 PROCEDURE — 80048 BASIC METABOLIC PNL TOTAL CA: CPT | Performed by: FAMILY MEDICINE

## 2022-01-12 PROCEDURE — 86901 BLOOD TYPING SEROLOGIC RH(D): CPT | Performed by: FAMILY MEDICINE

## 2022-01-12 PROCEDURE — 86850 RBC ANTIBODY SCREEN: CPT | Performed by: FAMILY MEDICINE

## 2022-01-12 PROCEDURE — 86900 BLOOD TYPING SEROLOGIC ABO: CPT | Performed by: FAMILY MEDICINE

## 2022-01-12 PROCEDURE — 36415 COLL VENOUS BLD VENIPUNCTURE: CPT | Performed by: FAMILY MEDICINE

## 2022-01-12 PROCEDURE — 83036 HEMOGLOBIN GLYCOSYLATED A1C: CPT | Performed by: FAMILY MEDICINE

## 2022-01-14 NOTE — TELEPHONE ENCOUNTER
[facility] Call Center    Phone Message    May a detailed message be left on voicemail: yes     Reason for Call: Other: Georgina with [facility] returning call from Georgina Hebert states Pt does not have a GLADYS on file and she is unable to speak with care team regarding Pt. Please advise. Thank you!     Action Taken: Message routed to:  [facility] ([facility]): PAC    Travel Screening: Not Applicable

## 2022-01-14 NOTE — TELEPHONE ENCOUNTER
Attempted to call back Georgina, from [facility] x2.  Unable to reach, (on hold).  This is in an attempt to schedule the patient an appointment with her PCP for diabetes control, (A1C <8).  Will continue to follow up.  Emilee Johnson RN

## 2022-01-17 ENCOUNTER — OFFICE VISIT (OUTPATIENT)
Dept: CARDIOLOGY | Facility: CLINIC | Age: 42
End: 2022-01-17
Payer: COMMERCIAL

## 2022-01-17 VITALS
BODY MASS INDEX: 33.04 KG/M2 | SYSTOLIC BLOOD PRESSURE: 155 MMHG | WEIGHT: 175 LBS | HEART RATE: 79 BPM | HEIGHT: 61 IN | DIASTOLIC BLOOD PRESSURE: 96 MMHG

## 2022-01-17 DIAGNOSIS — I25.10 CORONARY ARTERY DISEASE INVOLVING NATIVE CORONARY ARTERY OF NATIVE HEART WITHOUT ANGINA PECTORIS: ICD-10-CM

## 2022-01-17 DIAGNOSIS — E11.9 TYPE 2 DIABETES MELLITUS WITHOUT COMPLICATION, WITHOUT LONG-TERM CURRENT USE OF INSULIN (H): ICD-10-CM

## 2022-01-17 DIAGNOSIS — E78.3 HYPERCHYLOMICRONEMIA: ICD-10-CM

## 2022-01-17 DIAGNOSIS — I10 PRIMARY HYPERTENSION: Primary | ICD-10-CM

## 2022-01-17 DIAGNOSIS — Z01.818 PRE-OP EVALUATION: ICD-10-CM

## 2022-01-17 PROCEDURE — 93000 ELECTROCARDIOGRAM COMPLETE: CPT | Performed by: INTERNAL MEDICINE

## 2022-01-17 PROCEDURE — 99204 OFFICE O/P NEW MOD 45 MIN: CPT | Performed by: INTERNAL MEDICINE

## 2022-01-17 RX ORDER — METOPROLOL TARTRATE 25 MG/1
25 TABLET, FILM COATED ORAL 2 TIMES DAILY
Qty: 60 TABLET | Refills: 11 | Status: SHIPPED | OUTPATIENT
Start: 2022-01-17

## 2022-01-17 ASSESSMENT — MIFFLIN-ST. JEOR: SCORE: 1396.17

## 2022-01-17 NOTE — PROGRESS NOTES
HPI and Plan:   I had the pleasure of seeing Shauna Coemr in cardiology clinic for preoperative cardiac clearance.  She is a pleasant 41-year-old female.  She has past medical history of coronary artery disease.  In 2004 she had coronary angiography which revealed significant stenosis in the distal LAD which was angioplastied at [facility].  Subsequently she had an angiogram in 2006 which revealed complete occlusion of the LAD in the proximal portion of the previously placed stents.  There was left to left collateralization and therefore medical therapy was recommended.  She has seen a cardiologist last at [facility].  At that time, she had some chest pain following COVID infection.  This was actually December 2020.  She had some atypical chest pain and echocardiogram was done which revealed globally normal ejection fraction without wall motion abnormalities.  I was able to review cardiology notes as well as the echo results from [facility].    He also has past medical history of gastric sleeve weight reduction surgery.  Obesity.  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    Patient has had some sinus problems and intermittent sharp pain around the sinus area.  She was diagnosed with skull osteomyelitis requiring surgery.  She was referred by anesthesiologist for cardiac clearance for    She also has diabetes diagnosed at age 23.  She is on glipizide.  She is also restarted on another medication.  She smokes about 1 pack lasting for few days.  Presently, she has stopped cigarette smoking but is vaping.  There is no family history of premature CAD.  She takes atorvastatin 10 mg for her hyperlipidemia but no recent lipid profile numbers are available.  She is on baby aspirin.    She has intermittent shortness of breath but has been attributed to asthma.    She denies any chest pain.  No history of orthopnea PND or edema feet.  She claims that she does not exercise much but is able  to walk a flight of stairs without chest pain.    EKG done today revealed sinus rhythm with nonspecific ST changes with possible previous anterior Q waves with poor R wave progression.  Mild interventional conduction delay noted.    Exam  See below    Impression    1.  Encounter for preoperative cardiovascular clearance  Patient is scheduled for osteoma surgery.  She does have known coronary artery disease and previous ischemic cardiomyopathy as well as known occluded LAD.  She has not had any recent evaluation of her ischemic burden.  Fortunately, she is able to walk a block or 2 over climb a flight of stairs without any chest pain.  Her baseline EKG is abnormal.  Therefore, recommended further evaluation with a stress MRI to rule out underlying ischemia.  If there is no evidence of significant ischemia, she should proceed with surgery with low to moderate perioperative risk.    2.  Hypertension  Blood pressure is elevated.  She is on lisinopril 20 mg daily.  I would add metoprolol tartrate 25 g twice daily for both heart disease as well as controlling blood pressure better.  Side effects were discussed with    3.  Hyperlipidemia, we will check his lipid lipid profile numbers.  She is not fasting today.  LDL should be less than 70.  She might need higher dose of atorvastatin.    4.  Coronary artery disease, previous known LAD occlusion with left to left collaterals.  Recent echocardiogram at [facility] in 2020 revealed normal ejection fraction.  This suggested to collateralization is helped with blood flow to the LAD area and therefore the EF has improved.  We will assess for any ischemia with stress MRI as noted above.    5.  Skull base osteoma, surgery planned [facility]    Thank you for allowing us to participate in the care of this nice patient.  We will call her with results of stress MRI lipid profile and adjust medications as needed.  If there is significant ischemia, she might need further  evaluation.  If study results are stable, she should see us on an annual basis.  She might want to shift to [facility] [place] as she lives in [place]    Sincerely,    Bonilla Harrison MD        Today's clinic visit entailed:  Review of prior external note(s) from - Outside records from [facility]  Review of external notes as documented elsewhere in note  Review of the result(s) of each unique test - ekg, echo  Ordering of each unique test  Prescription drug management    Provider  Link to [facility] Help Grid     The level of medical decision making during this visit was of moderate complexity.      Orders Placed This Encounter   Procedures     Lipid Profile     Follow-Up with Cardiologist     EKG 12-lead complete w/read - Clinics (performed today)       Orders Placed This Encounter   Medications     metoprolol tartrate (LOPRESSOR) 25 MG tablet     Sig: Take 1 tablet (25 mg) by mouth 2 times daily     Dispense:  60 tablet     Refill:  11       There are no discontinued medications.      Encounter Diagnoses   Name Primary?     Pre-op evaluation      Primary hypertension Yes     Type 2 diabetes mellitus without complication, without long-term current use of insulin (H)      Hyperchylomicronemia      Coronary artery disease involving native coronary artery of native heart without angina pectoris        CURRENT MEDICATIONS:  Current Outpatient Medications   Medication Sig Dispense Refill     albuterol (PROAIR HFA/PROVENTIL HFA/VENTOLIN HFA) 108 (90 Base) MCG/ACT inhaler Inhale 1-2 puffs into the lungs every 4 hours as needed        aspirin 81 MG EC tablet Take 81 mg by mouth every morning        atorvastatin (LIPITOR) 10 MG tablet Take 10 mg by mouth every morning        clotrimazole (LOTRIMIN) 1 % external cream Apply topically daily as needed        escitalopram (LEXAPRO) 20 MG tablet every morning        fluticasone (FLONASE) 50 MCG/ACT nasal spray Spray 1 spray into both nostrils daily as needed         glipiZIDE (GLUCOTROL XL) 10 MG 24 hr tablet Take 10 mg by mouth every morning        lisinopril (ZESTRIL) 20 MG tablet Take 20 mg by mouth every morning        loratadine (CLARITIN) 10 MG tablet Take 10 mg by mouth every morning       metoprolol tartrate (LOPRESSOR) 25 MG tablet Take 1 tablet (25 mg) by mouth 2 times daily 60 tablet 11     multivitamin w/minerals (MULTI-VITAMIN) tablet Take 1 tablet by mouth every morning       ondansetron (ZOFRAN) 4 MG tablet Take by mouth every 8 hours as needed for nausea       triamcinolone (KENALOG) 0.1 % external cream daily as needed          ALLERGIES   No Known Allergies    PAST MEDICAL HISTORY:  Past Medical History:   Diagnosis Date     Anxiety      Diabetes mellitus, type 2 (H)      Dyslipidemia      HTN (hypertension)        PAST SURGICAL HISTORY:  Past Surgical History:   Procedure Laterality Date     ANGIOGRAM  2006    LAD restenosis medical management     CORONARY STENT PLACEMENT  01/01/2001    LAD stenting     GASTRECTOMY LAPAROSCOPIC SLEEVE  07/01/2013     IR MISCELLANEOUS PROCEDURE  11/12/2009     [place], laparoscopic salpingectomy with hysteroscopic polypectomy and novasure endometrial ablation   03/01/2021       FAMILY HISTORY:  Family History   Problem Relation Age of Onset     Anxiety Disorder Mother      Diabetes Mother      Diabetes Father      Hypertension Father      Diabetes Sister      Hypertension Sister      Cerebrovascular Disease Sister      Hypertension Brother      Hypertension Brother      Heart Failure Brother      Anesthesia Reaction No family hx of      Deep Vein Thrombosis (DVT) No family hx of        SOCIAL HISTORY:  Social History     Socioeconomic History     Marital status:      Spouse name: None     Number of children: None     Years of education: None     Highest education level: None   Occupational History     None   Tobacco Use     Smoking status: Light Tobacco Smoker     Smokeless tobacco: Never Used     Tobacco comment:  "1-2 puffs 5-7 toimes daily   Substance and Sexual Activity     Alcohol use: Yes     Comment: infrequent     Drug use: Not Currently     Sexual activity: None   Other Topics Concern     Parent/sibling w/ CABG, MI or angioplasty before 65F 55M? Not Asked   Social History Narrative     None     Social Determinants of Health     Financial Resource Strain: Not on file   Food Insecurity: Not on file   Transportation Needs: Not on file   Physical Activity: Not on file   Stress: Not on file   Social Connections: Not on file   Intimate Partner Violence: Not on file   Housing Stability: Not on file       Review of Systems:  Skin:  Negative       Eyes:  Positive for   r eye vein inclusion  ENT:  Negative      Respiratory:  Negative       Cardiovascular:    Positive for;dizziness    Gastroenterology: Negative      Genitourinary:  Negative      Musculoskeletal:  Negative      Neurologic:  Negative      Psychiatric:  Negative      Heme/Lymph/Imm:  Negative      Endocrine:  Positive for diabetes      Physical Exam:  Vitals: BP (!) 155/96 (BP Location: Left arm, Cuff Size: Adult Regular)   Pulse 79   Ht 1.549 m (5' 1\")   Wt 79.4 kg (175 lb)   BMI 33.07 kg/m      Constitutional:  in no acute distress        Skin:  warm and dry to the touch          Head:  not assessed this visit        Eyes:  sclera white        Lymph:No thyromegaly     ENT:           Neck:  JVP normal        Respiratory:  clear to auscultation         Cardiac: regular rhythm;normal S1 and S2     no presence of murmur                                                   GI:  not assessed this visit        Extremities and Muscular Skeletal:  no edema              Neurological:  no gross motor deficits        Psych:  Alert and Oriented x 3        CC  Serenity Miranda PA-C  [address] [place] [address]              "

## 2022-01-17 NOTE — LETTER
1/17/2022    Enriqueta Leone  [address]  [place] [address]    RE: Shauna Cubalauren       Dear Colleague,     I had the pleasure of seeing Shauna Mobley in the [facility].  HPI and Plan:   I had the pleasure of seeing Shauna Comer in cardiology clinic for preoperative cardiac clearance.  She is a pleasant 41-year-old female.  She has past medical history of coronary artery disease.  In 2004 she had coronary angiography which revealed significant stenosis in the distal LAD which was angioplastied at [facility].  Subsequently she had an angiogram in 2006 which revealed complete occlusion of the LAD in the proximal portion of the previously placed stents.  There was left to left collateralization and therefore medical therapy was recommended.  She has seen a cardiologist last at [facility].  At that time, she had some chest pain following COVID infection.  This was actually December 2020.  She had some atypical chest pain and echocardiogram was done which revealed globally normal ejection fraction without wall motion abnormalities.  I was able to review cardiology notes as well as the echo results from [facility].    He also has past medical history of gastric sleeve weight reduction surgery.  Obesity.  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    Patient has had some sinus problems and intermittent sharp pain around the sinus area.  She was diagnosed with skull osteomyelitis requiring surgery.  She was referred by anesthesiologist for cardiac clearance for    She also has diabetes diagnosed at age 23.  She is on glipizide.  She is also restarted on another medication.  She smokes about 1 pack lasting for few days.  Presently, she has stopped cigarette smoking but is vaping.  There is no family history of premature CAD.  She takes atorvastatin 10 mg for her hyperlipidemia but no recent lipid profile numbers are available.  She is on baby  aspirin.    She has intermittent shortness of breath but has been attributed to asthma.    She denies any chest pain.  No history of orthopnea PND or edema feet.  She claims that she does not exercise much but is able to walk a flight of stairs without chest pain.    EKG done today revealed sinus rhythm with nonspecific ST changes with possible previous anterior Q waves with poor R wave progression.  Mild interventional conduction delay noted.    Exam  See below    Impression    1.  Encounter for preoperative cardiovascular clearance  Patient is scheduled for osteoma surgery.  She does have known coronary artery disease and previous ischemic cardiomyopathy as well as known occluded LAD.  She has not had any recent evaluation of her ischemic burden.  Fortunately, she is able to walk a block or 2 over climb a flight of stairs without any chest pain.  Her baseline EKG is abnormal.  Therefore, recommended further evaluation with a stress MRI to rule out underlying ischemia.  If there is no evidence of significant ischemia, she should proceed with surgery with low to moderate perioperative risk.    2.  Hypertension  Blood pressure is elevated.  She is on lisinopril 20 mg daily.  I would add metoprolol tartrate 25 g twice daily for both heart disease as well as controlling blood pressure better.  Side effects were discussed with    3.  Hyperlipidemia, we will check his lipid lipid profile numbers.  She is not fasting today.  LDL should be less than 70.  She might need higher dose of atorvastatin.    4.  Coronary artery disease, previous known LAD occlusion with left to left collaterals.  Recent echocardiogram at [facility] in 2020 revealed normal ejection fraction.  This suggested to collateralization is helped with blood flow to the LAD area and therefore the EF has improved.  We will assess for any ischemia with stress MRI as noted above.    5.  Skull base osteoma, surgery planned [facility]    Thank you for  allowing us to participate in the care of this nice patient.  We will call her with results of stress MRI lipid profile and adjust medications as needed.  If there is significant ischemia, she might need further evaluation.  If study results are stable, she should see us on an annual basis.  She might want to shift to [facility] [place] as she lives in [place]    Sincerely,    Bonilla Harrison MD        Today's clinic visit entailed:  Review of prior external note(s) from - Outside records from [facility]  Review of external notes as documented elsewhere in note  Review of the result(s) of each unique test - ekg, echo  Ordering of each unique test  Prescription drug management    Provider  Link to [facility] Help Grid     The level of medical decision making during this visit was of moderate complexity.      Orders Placed This Encounter   Procedures     Lipid Profile     Follow-Up with Cardiologist     EKG 12-lead complete w/read - Clinics (performed today)       Orders Placed This Encounter   Medications     metoprolol tartrate (LOPRESSOR) 25 MG tablet     Sig: Take 1 tablet (25 mg) by mouth 2 times daily     Dispense:  60 tablet     Refill:  11       There are no discontinued medications.      Encounter Diagnoses   Name Primary?     Pre-op evaluation      Primary hypertension Yes     Type 2 diabetes mellitus without complication, without long-term current use of insulin (H)      Hyperchylomicronemia      Coronary artery disease involving native coronary artery of native heart without angina pectoris        CURRENT MEDICATIONS:  Current Outpatient Medications   Medication Sig Dispense Refill     albuterol (PROAIR HFA/PROVENTIL HFA/VENTOLIN HFA) 108 (90 Base) MCG/ACT inhaler Inhale 1-2 puffs into the lungs every 4 hours as needed        aspirin 81 MG EC tablet Take 81 mg by mouth every morning        atorvastatin (LIPITOR) 10 MG tablet Take 10 mg by mouth every morning        clotrimazole  (LOTRIMIN) 1 % external cream Apply topically daily as needed        escitalopram (LEXAPRO) 20 MG tablet every morning        fluticasone (FLONASE) 50 MCG/ACT nasal spray Spray 1 spray into both nostrils daily as needed        glipiZIDE (GLUCOTROL XL) 10 MG 24 hr tablet Take 10 mg by mouth every morning        lisinopril (ZESTRIL) 20 MG tablet Take 20 mg by mouth every morning        loratadine (CLARITIN) 10 MG tablet Take 10 mg by mouth every morning       metoprolol tartrate (LOPRESSOR) 25 MG tablet Take 1 tablet (25 mg) by mouth 2 times daily 60 tablet 11     multivitamin w/minerals (MULTI-VITAMIN) tablet Take 1 tablet by mouth every morning       ondansetron (ZOFRAN) 4 MG tablet Take by mouth every 8 hours as needed for nausea       triamcinolone (KENALOG) 0.1 % external cream daily as needed          ALLERGIES   No Known Allergies    PAST MEDICAL HISTORY:  Past Medical History:   Diagnosis Date     Anxiety      Diabetes mellitus, type 2 (H)      Dyslipidemia      HTN (hypertension)        PAST SURGICAL HISTORY:  Past Surgical History:   Procedure Laterality Date     ANGIOGRAM  2006    LAD restenosis medical management     CORONARY STENT PLACEMENT  01/01/2001    LAD stenting     GASTRECTOMY LAPAROSCOPIC SLEEVE  07/01/2013     IR MISCELLANEOUS PROCEDURE  11/12/2009     [place], laparoscopic salpingectomy with hysteroscopic polypectomy and novasure endometrial ablation   03/01/2021       FAMILY HISTORY:  Family History   Problem Relation Age of Onset     Anxiety Disorder Mother      Diabetes Mother      Diabetes Father      Hypertension Father      Diabetes Sister      Hypertension Sister      Cerebrovascular Disease Sister      Hypertension Brother      Hypertension Brother      Heart Failure Brother      Anesthesia Reaction No family hx of      Deep Vein Thrombosis (DVT) No family hx of        SOCIAL HISTORY:  Social History     Socioeconomic History     Marital status:      Spouse name: None     Number of  "children: None     Years of education: None     Highest education level: None   Occupational History     None   Tobacco Use     Smoking status: Light Tobacco Smoker     Smokeless tobacco: Never Used     Tobacco comment: 1-2 puffs 5-7 toimes daily   Substance and Sexual Activity     Alcohol use: Yes     Comment: infrequent     Drug use: Not Currently     Sexual activity: None   Other Topics Concern     Parent/sibling w/ CABG, MI or angioplasty before 65F 55M? Not Asked   Social History Narrative     None     Social Determinants of Health     Financial Resource Strain: Not on file   Food Insecurity: Not on file   Transportation Needs: Not on file   Physical Activity: Not on file   Stress: Not on file   Social Connections: Not on file   Intimate Partner Violence: Not on file   Housing Stability: Not on file       Review of Systems:  Skin:  Negative       Eyes:  Positive for   r eye vein inclusion  ENT:  Negative      Respiratory:  Negative       Cardiovascular:    Positive for;dizziness    Gastroenterology: Negative      Genitourinary:  Negative      Musculoskeletal:  Negative      Neurologic:  Negative      Psychiatric:  Negative      Heme/Lymph/Imm:  Negative      Endocrine:  Positive for diabetes      Physical Exam:  Vitals: BP (!) 155/96 (BP Location: Left arm, Cuff Size: Adult Regular)   Pulse 79   Ht 1.549 m (5' 1\")   Wt 79.4 kg (175 lb)   BMI 33.07 kg/m      Constitutional:  in no acute distress        Skin:  warm and dry to the touch          Head:  not assessed this visit        Eyes:  sclera white        Lymph:No thyromegaly     ENT:           Neck:  JVP normal        Respiratory:  clear to auscultation         Cardiac: regular rhythm;normal S1 and S2     no presence of murmur                                                   GI:  not assessed this visit        Extremities and Muscular Skeletal:  no edema              Neurological:  no gross motor deficits        Psych:  Alert and Oriented x 3  "       CC  Serenity Miranda PA-C  [address] [place] [address]    Thank you for allowing me to participate in the care of your patient.      Sincerely,     Bonilla Harrison MD     [facility]  cc:   Serenity Miranda PA-C  [address] [place] [address]

## 2022-01-19 ENCOUNTER — TELEPHONE (OUTPATIENT)
Dept: CARDIOLOGY | Facility: CLINIC | Age: 42
End: 2022-01-19

## 2022-01-19 ENCOUNTER — LAB (OUTPATIENT)
Dept: LAB | Facility: CLINIC | Age: 42
End: 2022-01-19
Payer: COMMERCIAL

## 2022-01-19 ENCOUNTER — HOSPITAL ENCOUNTER (OUTPATIENT)
Dept: CARDIOLOGY | Facility: CLINIC | Age: 42
Discharge: HOME | End: 2022-01-19
Attending: INTERNAL MEDICINE | Admitting: INTERNAL MEDICINE
Payer: COMMERCIAL

## 2022-01-19 VITALS — SYSTOLIC BLOOD PRESSURE: 152 MMHG | HEART RATE: 99 BPM | DIASTOLIC BLOOD PRESSURE: 88 MMHG

## 2022-01-19 DIAGNOSIS — E78.5 HLD (HYPERLIPIDEMIA): Primary | ICD-10-CM

## 2022-01-19 DIAGNOSIS — I10 HTN (HYPERTENSION): ICD-10-CM

## 2022-01-19 DIAGNOSIS — I25.10 CORONARY ARTERY DISEASE INVOLVING NATIVE CORONARY ARTERY OF NATIVE HEART WITHOUT ANGINA PECTORIS: ICD-10-CM

## 2022-01-19 DIAGNOSIS — Z01.818 PRE-OP EVALUATION: ICD-10-CM

## 2022-01-19 LAB
CHOLEST SERPL-MCNC: 153 MG/DL
FASTING STATUS PATIENT QL REPORTED: YES
HDLC SERPL-MCNC: 51 MG/DL
LDLC SERPL CALC-MCNC: 67 MG/DL
NONHDLC SERPL-MCNC: 102 MG/DL
TRIGL SERPL-MCNC: 176 MG/DL

## 2022-01-19 PROCEDURE — 255N000002 HC RX 255 OP 636: Performed by: INTERNAL MEDICINE

## 2022-01-19 PROCEDURE — 80061 LIPID PANEL: CPT

## 2022-01-19 PROCEDURE — 93017 CV STRESS TEST TRACING ONLY: CPT

## 2022-01-19 PROCEDURE — A9585 GADOBUTROL INJECTION: HCPCS | Performed by: INTERNAL MEDICINE

## 2022-01-19 PROCEDURE — 93018 CV STRESS TEST I&R ONLY: CPT | Performed by: INTERNAL MEDICINE

## 2022-01-19 PROCEDURE — 36415 COLL VENOUS BLD VENIPUNCTURE: CPT

## 2022-01-19 PROCEDURE — 75563 CARD MRI W/STRESS IMG & DYE: CPT | Mod: 26 | Performed by: INTERNAL MEDICINE

## 2022-01-19 PROCEDURE — 250N000011 HC RX IP 250 OP 636: Performed by: INTERNAL MEDICINE

## 2022-01-19 PROCEDURE — 93016 CV STRESS TEST SUPVJ ONLY: CPT | Performed by: INTERNAL MEDICINE

## 2022-01-19 RX ORDER — DIPHENHYDRAMINE HYDROCHLORIDE 50 MG/ML
25-50 INJECTION INTRAMUSCULAR; INTRAVENOUS
Status: DISCONTINUED | OUTPATIENT
Start: 2022-01-19 | End: 2022-01-20 | Stop reason: HOSPADM

## 2022-01-19 RX ORDER — GADOBUTROL 604.72 MG/ML
20 INJECTION INTRAVENOUS ONCE
Status: COMPLETED | OUTPATIENT
Start: 2022-01-19 | End: 2022-01-19

## 2022-01-19 RX ORDER — METHYLPREDNISOLONE SODIUM SUCCINATE 125 MG/2ML
125 INJECTION, POWDER, LYOPHILIZED, FOR SOLUTION INTRAMUSCULAR; INTRAVENOUS
Status: DISCONTINUED | OUTPATIENT
Start: 2022-01-19 | End: 2022-01-20 | Stop reason: HOSPADM

## 2022-01-19 RX ORDER — REGADENOSON 0.08 MG/ML
0.4 INJECTION, SOLUTION INTRAVENOUS ONCE
Status: COMPLETED | OUTPATIENT
Start: 2022-01-19 | End: 2022-01-19

## 2022-01-19 RX ORDER — DIAZEPAM 5 MG
5 TABLET ORAL EVERY 30 MIN PRN
Status: DISCONTINUED | OUTPATIENT
Start: 2022-01-19 | End: 2022-01-20 | Stop reason: HOSPADM

## 2022-01-19 RX ORDER — ALBUTEROL SULFATE 90 UG/1
2 AEROSOL, METERED RESPIRATORY (INHALATION) EVERY 5 MIN PRN
Status: DISCONTINUED | OUTPATIENT
Start: 2022-01-19 | End: 2022-01-20 | Stop reason: HOSPADM

## 2022-01-19 RX ORDER — AMINOPHYLLINE 25 MG/ML
100 INJECTION, SOLUTION INTRAVENOUS ONCE
Status: DISCONTINUED | OUTPATIENT
Start: 2022-01-19 | End: 2022-01-20 | Stop reason: HOSPADM

## 2022-01-19 RX ORDER — CAFFEINE CITRATE 20 MG/ML
60 SOLUTION INTRAVENOUS
Status: DISCONTINUED | OUTPATIENT
Start: 2022-01-19 | End: 2022-01-20 | Stop reason: HOSPADM

## 2022-01-19 RX ORDER — ONDANSETRON 2 MG/ML
4 INJECTION INTRAMUSCULAR; INTRAVENOUS
Status: DISCONTINUED | OUTPATIENT
Start: 2022-01-19 | End: 2022-01-20 | Stop reason: HOSPADM

## 2022-01-19 RX ORDER — DIPHENHYDRAMINE HCL 25 MG
25 CAPSULE ORAL
Status: DISCONTINUED | OUTPATIENT
Start: 2022-01-19 | End: 2022-01-20 | Stop reason: HOSPADM

## 2022-01-19 RX ORDER — ACYCLOVIR 200 MG/1
0-1 CAPSULE ORAL
Status: DISCONTINUED | OUTPATIENT
Start: 2022-01-19 | End: 2022-01-20 | Stop reason: HOSPADM

## 2022-01-19 RX ADMIN — REGADENOSON 0.4 MG: 0.08 INJECTION, SOLUTION INTRAVENOUS at 10:16

## 2022-01-19 RX ADMIN — GADOBUTROL 20 ML: 604.72 INJECTION INTRAVENOUS at 11:02

## 2022-01-19 NOTE — TELEPHONE ENCOUNTER
Cardiac MRI results:     Regadenoson induced stress perfusion is negative for ischemia.  Normal biventricular size with globally normal LV and RV systolic function. There is thinning and akinesis  of the apex, distal septal and apicoinferior segments.  Quantitative LVEF 63 %. Quantitative RVEF 60 %.   Delayed hyperenhancement reveals transmural scar in the distal anteroseptal, apical and apicoinferior  segments. LV scar size is 11 %.    Pt needs cardiac clearance per anesthesia for upcoming procedure. Will message Provider for review . GISSEL Mayorga RN

## 2022-01-20 NOTE — TELEPHONE ENCOUNTER
Call out to Pt informed her No ischemia on test and per Cardiologist she can proceed with surgery. GISSEL Mayorga RN

## 2022-01-21 ENCOUNTER — TELEPHONE (OUTPATIENT)
Dept: OTOLARYNGOLOGY | Facility: CLINIC | Age: 42
End: 2022-01-21

## 2022-01-21 NOTE — TELEPHONE ENCOUNTER
I called Shauna and we discussed surgery in context of updated HbA1c of 8.9. We discussed ideal range of <8 for surgery and wound healing. I let her know that we would need to postpone surgery to allow time for improved blood sugar control and she was very understanding. We will be in touch about updated surgery date.     Bry Garcia MD    [facility]  [facility]  [facility]  Department of Otolaryngology - Head & Neck Surgery

## 2022-02-23 NOTE — TELEPHONE ENCOUNTER
FUTURE VISIT INFORMATION      SURGERY INFORMATION:    Date: 3/1/22    Location: uu or    Surgeon:  Bry Garcia MD    Anesthesia Type:  General    Procedure: ENDOSCOPIC, ENDONASAL IMAGE-GUIDED RESECTION OF SKULL BASE OSTEOMA    RECORDS REQUESTED FROM:       Primary Care Provider: Enriqueta Leone MD - Sar[facility]    Pertinent Medical History: Hypertension, CAD    Most recent EKG+ Tracin22    Most recent ECHO: 20- [facility]

## 2022-02-24 ENCOUNTER — VIRTUAL VISIT (OUTPATIENT)
Dept: SURGERY | Facility: CLINIC | Age: 42
End: 2022-02-24
Payer: COMMERCIAL

## 2022-02-24 ENCOUNTER — PRE VISIT (OUTPATIENT)
Dept: SURGERY | Facility: CLINIC | Age: 42
End: 2022-02-24

## 2022-02-24 ENCOUNTER — ANESTHESIA EVENT (OUTPATIENT)
Dept: SURGERY | Facility: CLINIC | Age: 42
End: 2022-02-24
Payer: COMMERCIAL

## 2022-02-24 DIAGNOSIS — Z01.818 PREOP EXAMINATION: ICD-10-CM

## 2022-02-24 DIAGNOSIS — D16.4 OSTEOMA OF PARANASAL SINUS: ICD-10-CM

## 2022-02-24 DIAGNOSIS — E11.9 TYPE 2 DIABETES MELLITUS WITHOUT COMPLICATION, WITHOUT LONG-TERM CURRENT USE OF INSULIN (H): Primary | ICD-10-CM

## 2022-02-24 PROCEDURE — 99214 OFFICE O/P EST MOD 30 MIN: CPT | Mod: 95 | Performed by: PHYSICIAN ASSISTANT

## 2022-02-24 ASSESSMENT — ENCOUNTER SYMPTOMS: SEIZURES: 0

## 2022-02-24 ASSESSMENT — LIFESTYLE VARIABLES: TOBACCO_USE: 1

## 2022-02-24 NOTE — PATIENT INSTRUCTIONS
Preparing for Your Surgery      Name:  Shauna Mobley   MRN:  [id number]   :  1980   Today's Date:  2022       Arriving for surgery:  Surgery date:  3/1/2022  Arrival time:  10:45am    Restrictions due to COVID 19       Effective 22 [facility] is implementing the following visitor policy:     1 person may accompany the patient through the Pre-Op process.      That same person may wait in the Surgery Waiting room, provided there is enough room to social distance         Inpatients are allowed 2 visitors per day for the duration of their stay.        Visitors must wear a mask.      Visitors must not be ill.      Visiting hours are 8 am to 8 pm.    [employer] parking is available for anyone with mobility limitations or disabilities.  ([place]  24 hours/ 7 days a week; [facility]  7 am- 3:30 pm, Mon- Fri)    Please come to:     [facility] [address]  [address] [place] [address]    -   Parking is available in the Patient Visitor Ramp on [facility].     -   When entering the hospital you will be asked COVID screening questions, you will then be directed to Registration.  Registration will direct you to the 3rd floor Surgery waiting room.     -   Please ask if you need an escort or a wheelchair to the Surgery Waiting Room.  Preop number- [phone]        What can I eat or drink?  -  You may eat and drink normally for up to 8 hours before your surgery. (Until 4:45am)  -  You may have clear liquids until 2 hours before surgery. (Until 10:45am)    Examples of clear liquids:  Water  Clear broth  Juices (apple, white grape, white cranberry  and cider) without pulp  Noncarbonated, powder based beverages  (lemonade and Leonel-Aid)  Sodas (Sprite, 7-Up, ginger ale and seltzer)  Coffee or tea (without milk or cream)  Gatorade    -  No Alcohol for at least 24 hours before surgery     Which medicines can I take?    **Hold Aspirin  for 7 days before surgery.   **Hold Multivitamins for 7 days before surgery.  **Hold Supplements for 7 days before surgery.  Hold Ibuprofen (Advil, Motrin) for 1 day before surgery--unless otherwise directed by surgeon.  Hold Naproxen (Aleve) for 4 days before surgery.      -  DO NOT take these medications the day of surgery:          Glipizide(Glucotrol)                             Lisinopril  Metformin     -  PLEASE TAKE these medications the day of surgery:          Albuterol inhaler as needed, bring the day of surgery          Flonase as needed                             Ondansetron as needed          Atorvastatin(Lipitor)                             Escitalopram(Lexapro)          Loratadine(Claritin)    How do I prepare myself?  - Please take 2 showers before surgery using Scrubcare or Hibiclens soap.    Use this soap only from the neck to your toes.     Leave the soap on your skin for one minute--then rinse thoroughly.      You may use your own shampoo and conditioner; no other hair products.   - Please remove all jewelry and body piercings.  - No lotions, deodorants or fragrance.  - No makeup or fingernail polish.   - Bring your ID and insurance card.    -If you have a Deep Brain Stimulator, Spinal Cord Stimulator or any neuro stimulator device---you must bring the remote control to the hospital     - All patients are required to have a Covid-19 test within 4 days of surgery/procedure.      -Patients will be contacted by the [facility] scheduling team within 1 week of surgery to make an appointment.      - Patients may call the Scheduling team at [phone] if they have not been scheduled within 4 days of  surgery.      ALL PATIENTS GOING HOME THE SAME DAY OF SURGERY ARE REQUIRED TO HAVE A RESPONSIBLE ADULT TO DRIVE AND BE IN ATTENDANCE WITH THEM FOR 24 HOURS FOLLOWING SURGERY.      Questions or Concerns:    - For any questions regarding the day of surgery or your hospital stay, please contact the  Pre Admission Nursing Office at [phone].       - If you have health changes between today and your surgery please call your surgeon.       For questions after surgery please call your surgeons office.

## 2022-02-24 NOTE — PROGRESS NOTES
Shauna is a 42 year old who is being evaluated via a billable video visit.      How would you like to obtain your AVS? MyChart  If the video visit is dropped, the invitation should be resent by: Text to cell phone: [phone]      HPI         Review of Systems     Physical Exam

## 2022-02-24 NOTE — H&P
Pre-Operative H & P     CC:  Preoperative exam to assess for increased cardiopulmonary risk while undergoing surgery and anesthesia.    Date of Encounter: 2/24/2022  Primary Care Physician:  Enriqueta Leone     Reason for visit:   Encounter Diagnoses   Name Primary?     Preop examination Yes     Osteoma of paranasal sinus        HPI  Shauna Mobley is a 42 year old female who presents for pre-operative H & P in preparation for  Procedure Information     Case: [id number] Date/Time: 03/01/22 1245    Procedure: Endoscopic Endonasal Image-Guided Resection of Skull Base Osteoma (Bilateral Head)    Anesthesia type: General    Diagnosis: Osteoma of paranasal sinus [D16.4]    Pre-op diagnosis: Osteoma of paranasal sinus [D16.4]    Location:  OR [address] [place] [address] OR    Providers: Bry Garcia MD        Ms. Mobley is a 42 year old female with PMH significant for hypertension, dyslipidemia, coronary artery disease status post stent placement 2004, 2006, history ischemic cardiomyopathy, mild asthma, current vape use, diabetes, obesity, and anxiety.  Patient was initially scheduled earlier this year for the above surgery but was delayed due to elevated A1c of 8.9%.  Patient was started on Metformin and needs to have her A1c checked again.  Otherwise she is ready to proceed with the above procedure.      History is obtained from the patient and chart review    Hx of abnormal bleeding or anti-platelet use: ASA 81    Menstrual history: No LMP recorded. Patient has had an ablation.:      Past Medical History  Past Medical History:   Diagnosis Date     Anxiety      Asthma      Coronary artery disease involving native coronary artery of native heart without angina pectoris      Diabetes mellitus, type 2 (H)      Dyslipidemia      History of coronary angioplasty with insertion of stent      HTN (hypertension)      Obesity        Past Surgical History  Past Surgical History:   Procedure Laterality Date     ANGIOGRAM  2006    LAD restenosis  medical management     CORONARY STENT PLACEMENT  01/01/2001    LAD stenting     GASTRECTOMY LAPAROSCOPIC SLEEVE  07/01/2013     IR MISCELLANEOUS PROCEDURE  11/12/2009     [place], laparoscopic salpingectomy with hysteroscopic polypectomy and novasure endometrial ablation   03/01/2021       Prior to Admission Medications  Current Outpatient Medications   Medication Sig Dispense Refill     albuterol (PROAIR HFA/PROVENTIL HFA/VENTOLIN HFA) 108 (90 Base) MCG/ACT inhaler Inhale 1-2 puffs into the lungs every 4 hours as needed        aspirin 81 MG EC tablet Take 81 mg by mouth every morning        atorvastatin (LIPITOR) 10 MG tablet Take 10 mg by mouth every morning        clotrimazole (LOTRIMIN) 1 % external cream Apply topically daily as needed        escitalopram (LEXAPRO) 20 MG tablet every morning        fluticasone (FLONASE) 50 MCG/ACT nasal spray Spray 1 spray into both nostrils daily as needed        glipiZIDE (GLUCOTROL XL) 10 MG 24 hr tablet Take 10 mg by mouth every morning        lisinopril (ZESTRIL) 20 MG tablet Take 20 mg by mouth every morning        loratadine (CLARITIN) 10 MG tablet Take 10 mg by mouth every morning       metoprolol tartrate (LOPRESSOR) 25 MG tablet Take 1 tablet (25 mg) by mouth 2 times daily 60 tablet 11     multivitamin w/minerals (MULTI-VITAMIN) tablet Take 1 tablet by mouth every morning       ondansetron (ZOFRAN) 4 MG tablet Take by mouth every 8 hours as needed for nausea       triamcinolone (KENALOG) 0.1 % external cream daily as needed          Allergies  No Known Allergies    Social History  Social History     Socioeconomic History     Marital status:      Spouse name: Not on file     Number of children: Not on file     Years of education: Not on file     Highest education level: Not on file   Occupational History     Not on file   Tobacco Use     Smoking status: Light Tobacco Smoker     Smokeless tobacco: Never Used     Tobacco comment: 1-2 puffs 5-7 toimes daily    Substance and Sexual Activity     Alcohol use: Yes     Comment: infrequent     Drug use: Not Currently     Sexual activity: Not on file   Other Topics Concern     Parent/sibling w/ CABG, MI or angioplasty before 65F 55M? Not Asked   Social History Narrative     Not on file     Social Determinants of Health     Financial Resource Strain: Not on file   Food Insecurity: Not on file   Transportation Needs: Not on file   Physical Activity: Not on file   Stress: Not on file   Social Connections: Not on file   Intimate Partner Violence: Not on file   Housing Stability: Not on file       Family History  Family History   Problem Relation Age of Onset     Anxiety Disorder Mother      Diabetes Mother      Diabetes Father      Hypertension Father      Diabetes Sister      Hypertension Sister      Cerebrovascular Disease Sister      Hypertension Brother      Hypertension Brother      Heart Failure Brother      Anesthesia Reaction No family hx of      Deep Vein Thrombosis (DVT) No family hx of        Review of Systems  The complete review of systems is negative other than noted in the HPI or here.       Anesthesia Evaluation   Pt has had prior anesthetic.     No history of anesthetic complications       ROS/MED HX  ENT/Pulmonary: Comment: Used to use CPAP but stopped after significant weight loss    (+) VINICIUS risk factors, hypertension, tobacco use (vaping tobacco ), Current use, Intermittent, asthma Last exacerbation: none recent,  Treatment: Inhaler prn,      Neurologic: Comment: H/o retinal vein occlusion ~ 2015/2016   (-) no seizures and no CVA   Cardiovascular:     (+) Dyslipidemia hypertension--CAD --stent-2004, 2006. Previous cardiac testing   Echo: Date: Results:    Stress Test: Date: Results:    ECG Reviewed: Date: 1/19/22 Results:    Cath: Date: Results:      METS/Exercise Tolerance: 4 - Raking leaves, gardening    Hematologic:  - neg hematologic  ROS  (-) history of blood clots and history of blood transfusion    Musculoskeletal:  - neg musculoskeletal ROS     GI/Hepatic: Comment: H/o bariatric surgery      Renal/Genitourinary:  - neg Renal ROS     Endo:     (+) type II DM, Last HgA1c: 8.9, date: 1/12/2022, Not using insulin, Obesity,  (-) thyroid disease   Psychiatric/Substance Use:     (+) psychiatric history anxiety     Infectious Disease:       Malignancy: Comment: Osteoma of paranasal sinus      Other:  - neg other ROS          Virtual visit -  No vitals were obtained    Physical Exam  Constitutional: Awake, alert, cooperative, no apparent distress, and appears stated age.  HENT: Normocephalic  Respiratory: non labored breathing   Neurologic: Awake, alert, oriented to name, place and time.   Neuropsychiatric: Calm, cooperative. Normal affect.      Prior Labs/Diagnostic Studies   All labs and imaging personally reviewed     Component      Latest Ref Rng & Units 1/12/2022   Hemoglobin A1C      0.0 - 5.6 % 8.9 (H)     Component      Latest Ref Rng & Units 1/12/2022   WBC      4.0 - 11.0 10e3/uL 8.4   RBC Count      3.80 - 5.20 10e6/uL 4.83   Hemoglobin      11.7 - 15.7 g/dL 13.8   Hematocrit      35.0 - 47.0 % 40.5   MCV      78 - 100 fL 84   MCH      26.5 - 33.0 pg 28.6   MCHC      31.5 - 36.5 g/dL 34.1   RDW      10.0 - 15.0 % 12.4   Platelet Count      150 - 450 10e3/uL 264     Component      Latest Ref Rng & Units 1/12/2022   Sodium      136 - 145 mmol/L 140   Potassium      3.5 - 5.0 mmol/L 3.7   Chloride      98 - 107 mmol/L 105   Carbon Dioxide      22 - 31 mmol/L 26   Anion Gap      5 - 18 mmol/L 9   Urea Nitrogen      8 - 22 mg/dL 17   Creatinine      0.60 - 1.10 mg/dL 0.90   Calcium      8.5 - 10.5 mg/dL 8.9   Glucose      70 - 125 mg/dL 155 (H)   GFR Estimate      >60 mL/min/1.73m2 82       EKG/ stress test - if available please see in ROS above     Cardiac MR w/contrast and stress: 1/19/22:  Regadenoson induced stress perfusion is negative for ischemia.  Normal biventricular size with globally normal LV and  RV systolic function. There is thinning and akinesis  of the apex, distal septal and apicoinferior segments.  Quantitative LVEF 63 %. Quantitative RVEF 60 %.   Delayed hyperenhancement reveals transmural scar in the distal anteroseptal, apical and apicoinferior  segments. LV scar size is 11 %          The patient's records and results personally reviewed by this provider.         Outside records reviewed from: Care Everywhere      Assessment      Shauna Mobley is a 42 year old female was seen as a PAC referral for risk assessment and optimization for anesthesia.    Plan/Recommendations  Pt will be optimized for the proposed procedure.  See below for details on the assessment, risk, and preoperative recommendations    NEUROLOGY  - No history of TIA, CVA or seizure  - Post Op delirium risk factors:  No risk identified    ENT  - Unable to assess  Mallampati: Unable to assess  TM: Unable to assess    CARDIAC  - CAD with h/o coronary stent placement 2004, 2006.  Recent visit with Dr. Harrison 1/17/22, and Cardiac MR with stress (see above) without ischemia.    - Hypertension, hold lisinopril DOS.  Continue metoprolol  - Hyperlipidemia, continue metoprolol    - METS (Metabolic Equivalents), patient cleans house, can ascend a flight of stairs without exertional symptoms  Patient performs 4 or more METS exercise without symptoms  Total Score: 0      RCRI-Moderate risk: Class 3  6.6% complication rate  Total Score: 2           RCRI: CAD    RCRI: CHF        PULMONARY  - Obstructive Sleep Apnea  VINICIUS without home CPAP use.  She has h/o VINICIUS with prior CPAP use but stopped using in 2014 due to significant weight loss after bariatric surgery    VINICIUS Medium Risk  Total Score: 3           VINICIUS: Snores loudly    VINICIUS: Observed stopped breathing    VINICIUS: Hypertension      - Asthma  Well controlled     - Tobacco History      History   Smoking Status     Light Tobacco Smoker   Smokeless Tobacco     Never Used     Comment: 1-2 puffs 5-7 toimes  "daily       GI    PONV Medium Risk  Total Score: 2           1 AN PONV: Pt is Female    1 AN PONV: Intended Post Op Opioids            ENDOCRINE    - BMI: Estimated body mass index is 33.07 kg/m  as calculated from the following:    Height as of 1/17/22: 1.549 m (5' 1\").    Weight as of 1/17/22: 79.4 kg (175 lb).  Obesity (BMI >30)  - Diabetes  Diabetes Mellitus, Type 2, non-insulin dependent.  Hold morning oral hypoglycemic medications. Recommend close monitoring of the patient's blood glucose levels throughout the perioperative period.  - A1c 8.9% 1/12/22.  Need to recheck.    HEME  VTE Low Risk 0.26%  Total Score: 0      - No history of abnormal bleeding or antiplatelet use.        PSYCH  - anxiety, continue Lexapro      The patient is optimized for their procedure. AVS with information on surgery time/arrival time, meds and NPO status given by nursing staff. No further diagnostic testing indicated.    Please refer to the physical examination documented by the anesthesiologist in the anesthesia record on the day of surgery.      Addendum:  A1c today, 2/25/22 8.0 (down from 8.91/12/22).  Staff message sent to Dr. Garcia.      Video-Visit Details    Type of service:  Video Visit    Patient verbally consented to video service today: YES    Video Start Time: 1140  Video End Time (time video stopped): 1152    Originating Location (pt. Location): Home    Distant Location (provider location):  home    Mode of Communication:  Video Conference via [employer]  On the day of service:     Prep time: 10 minutes  Visit time: 12 minutes  Documentation time: 15 minutes  ------------------------------------------  Total time: 37 minutes      Michelle Rhodes PA-C  Preoperative Assessment Center  [facility]  Clinic and Surgery Center  Phone: [phone]  Fax: [phone]  "

## 2022-02-25 ENCOUNTER — APPOINTMENT (OUTPATIENT)
Dept: LAB | Facility: CLINIC | Age: 42
End: 2022-02-25
Payer: COMMERCIAL

## 2022-02-25 ENCOUNTER — LAB (OUTPATIENT)
Dept: LAB | Facility: CLINIC | Age: 42
End: 2022-02-25

## 2022-02-25 DIAGNOSIS — Z01.818 PREOP EXAMINATION: ICD-10-CM

## 2022-02-25 DIAGNOSIS — Z11.59 ENCOUNTER FOR SCREENING FOR OTHER VIRAL DISEASES: ICD-10-CM

## 2022-02-25 DIAGNOSIS — E11.9 TYPE 2 DIABETES MELLITUS WITHOUT COMPLICATION, WITHOUT LONG-TERM CURRENT USE OF INSULIN (H): ICD-10-CM

## 2022-02-25 LAB
HBA1C MFR BLD: 8 % (ref 0–5.6)
SARS-COV-2 RNA RESP QL NAA+PROBE: NEGATIVE

## 2022-02-25 PROCEDURE — U0003 INFECTIOUS AGENT DETECTION BY NUCLEIC ACID (DNA OR RNA); SEVERE ACUTE RESPIRATORY SYNDROME CORONAVIRUS 2 (SARS-COV-2) (CORONAVIRUS DISEASE [COVID-19]), AMPLIFIED PROBE TECHNIQUE, MAKING USE OF HIGH THROUGHPUT TECHNOLOGIES AS DESCRIBED BY CMS-2020-01-R: HCPCS | Mod: 90 | Performed by: PATHOLOGY

## 2022-02-25 PROCEDURE — 36415 COLL VENOUS BLD VENIPUNCTURE: CPT | Performed by: PATHOLOGY

## 2022-02-25 PROCEDURE — 99000 SPECIMEN HANDLING OFFICE-LAB: CPT | Performed by: PATHOLOGY

## 2022-02-25 PROCEDURE — 83036 HEMOGLOBIN GLYCOSYLATED A1C: CPT | Performed by: PATHOLOGY

## 2022-02-25 PROCEDURE — U0005 INFEC AGEN DETEC AMPLI PROBE: HCPCS | Mod: 90 | Performed by: PATHOLOGY

## 2022-02-28 LAB — GLUCOSE BLDC GLUCOMTR-MCNC: 125 MG/DL (ref 70–99)

## 2022-02-28 PROCEDURE — 82962 GLUCOSE BLOOD TEST: CPT

## 2022-02-28 ASSESSMENT — ENCOUNTER SYMPTOMS: SEIZURES: 0

## 2022-02-28 ASSESSMENT — LIFESTYLE VARIABLES: TOBACCO_USE: 1

## 2022-02-28 NOTE — ANESTHESIA PREPROCEDURE EVALUATION
Anesthesia Pre-Procedure Evaluation    Patient: Shauna Mobley   MRN: [id number] : 1980        Procedure : Procedure(s):  Endoscopic Endonasal Image-Guided Resection of Skull Base Osteoma          Past Medical History:   Diagnosis Date     Anxiety      Asthma      Coronary artery disease involving native coronary artery of native heart without angina pectoris      Diabetes mellitus, type 2 (H)      Dyslipidemia      History of coronary angioplasty with insertion of stent      HTN (hypertension)      Obesity       Past Surgical History:   Procedure Laterality Date     ANGIOGRAM  2006    LAD restenosis medical management     CORONARY STENT PLACEMENT  2001    LAD stenting     GASTRECTOMY LAPAROSCOPIC SLEEVE  2013     IR MISCELLANEOUS PROCEDURE  2009     [place], laparoscopic salpingectomy with hysteroscopic polypectomy and novasure endometrial ablation   2021      No Known Allergies   Social History     Tobacco Use     Smoking status: Light Tobacco Smoker     Smokeless tobacco: Never Used     Tobacco comment: 1-2 puffs 5-7 toimes daily   Substance Use Topics     Alcohol use: Yes     Comment: infrequent      Wt Readings from Last 1 Encounters:   22 79.4 kg (175 lb)        Anesthesia Evaluation   Pt has had prior anesthetic. Type: General.    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 LHC with L->L collaterals    Cardiac stress 2022:  Normal LV/RV function  Akinesis of LV walls of LAD distribution  LVEF 63%, RVEF  60%  No inducible ischemia    (+) Dyslipidemia hypertension--CAD --stent (re-thrombosed, medical management)-2004, 2006. Previous cardiac testing   Echo: Date: Results:    Stress Test: Date: 1/19/2022 Results:  Regadenoson induced stress perfusion is negative for ischemia.  Normal biventricular size with globally normal LV and RV systolic function. There is thinning and akinesis  of the apex, distal septal and apicoinferior segments.  Quantitative LVEF 63 %. Quantitative RVEF 60 %.   Delayed hyperenhancement reveals transmural scar in the distal anteroseptal, apical and apicoinferior  segments. LV scar size is 11 %.  ECG Reviewed: Date: 1/17/22 Results:  - SR, 75 bpm  - Old anterior infarct  Cath: Date: Results:      METS/Exercise Tolerance: 4 - Raking leaves, gardening    Hematologic:  - neg hematologic  ROS   (+) history of blood transfusion, no previous transfusion reaction,  (-) history of blood clots   Musculoskeletal:  - neg musculoskeletal ROS     GI/Hepatic: Comment: H/o bariatric surgery/sleeve gastrectomy      Renal/Genitourinary:  - neg Renal ROS     Endo:     (+) type II DM, Last HgA1c: 8.0, date: 2/25/2022, Not using insulin, Obesity,  (-) thyroid disease   Psychiatric/Substance Use:     (+) psychiatric history anxiety     Infectious Disease:       Malignancy: Comment: Osteoma of paranasal sinus      Other:  - neg other ROS          Physical Exam    Airway        Mallampati: I   TM distance: > 3 FB   Neck ROM: full   Mouth opening: > 3 cm    Respiratory Devices and Support         Dental  no notable dental history         Cardiovascular          Rhythm and rate: regular and normal     Pulmonary           breath sounds clear to auscultation           OUTSIDE LABS:  CBC:   Lab Results   Component Value Date    WBC 8.4 01/12/2022    HGB 13.8 01/12/2022    HCT 40.5 01/12/2022     01/12/2022     BMP:   Lab Results   Component Value Date     01/12/2022    POTASSIUM 3.7 01/12/2022    CHLORIDE 105  01/12/2022    CO2 26 01/12/2022    BUN 17 01/12/2022    CR 0.90 01/12/2022     (H) 02/28/2022     (H) 01/12/2022     COAGS: No results found for: PTT, INR, FIBR  POC: No results found for: BGM, HCG, HCGS  HEPATIC:   Lab Results   Component Value Date    PROTTOTAL 7.6 06/14/2011     OTHER:   Lab Results   Component Value Date    A1C 8.0 (H) 02/25/2022    PEGGY 8.9 01/12/2022       Anesthesia Plan    ASA Status:  3   NPO Status:  NPO Appropriate    Anesthesia Type: General.     - Airway: ETT   Induction: Intravenous.   Maintenance: Inhalation.   Techniques and Equipment:     - Lines/Monitors: 2nd IV, BIS     Consents         - Extended Intubation/Ventilatory Support Discussed: No.      - Patient is DNR/DNI Status: No    Use of blood products discussed: No .     Postoperative Care    Pain management: Oral pain medications.   PONV prophylaxis: Ondansetron (or other 5HT-3), Dexamethasone or Solumedrol     Comments:                Kenneth Lord MD

## 2022-03-01 ENCOUNTER — HOSPITAL ENCOUNTER (OUTPATIENT)
Facility: CLINIC | Age: 42
Discharge: HOME | End: 2022-03-01
Attending: OTOLARYNGOLOGY | Admitting: OTOLARYNGOLOGY
Payer: COMMERCIAL

## 2022-03-01 ENCOUNTER — ANESTHESIA (OUTPATIENT)
Dept: SURGERY | Facility: CLINIC | Age: 42
End: 2022-03-01
Payer: COMMERCIAL

## 2022-03-01 VITALS
HEART RATE: 88 BPM | DIASTOLIC BLOOD PRESSURE: 79 MMHG | OXYGEN SATURATION: 97 % | BODY MASS INDEX: 34.38 KG/M2 | RESPIRATION RATE: 20 BRPM | SYSTOLIC BLOOD PRESSURE: 156 MMHG | HEIGHT: 61 IN | WEIGHT: 182.1 LBS | TEMPERATURE: 98 F

## 2022-03-01 DIAGNOSIS — J34.9: Primary | ICD-10-CM

## 2022-03-01 LAB
GLUCOSE BLDC GLUCOMTR-MCNC: 107 MG/DL (ref 70–99)
GLUCOSE BLDC GLUCOMTR-MCNC: 167 MG/DL (ref 70–99)
POTASSIUM BLD-SCNC: 4 MMOL/L (ref 3.4–5.3)

## 2022-03-01 PROCEDURE — 36415 COLL VENOUS BLD VENIPUNCTURE: CPT | Performed by: ANESTHESIOLOGY

## 2022-03-01 PROCEDURE — 250N000011 HC RX IP 250 OP 636: Performed by: ANESTHESIOLOGY

## 2022-03-01 PROCEDURE — 250N000011 HC RX IP 250 OP 636: Performed by: NURSE ANESTHETIST, CERTIFIED REGISTERED

## 2022-03-01 PROCEDURE — 88311 DECALCIFY TISSUE: CPT | Mod: 26 | Performed by: PATHOLOGY

## 2022-03-01 PROCEDURE — 360N000078 HC SURGERY LEVEL 5, PER MIN: Performed by: OTOLARYNGOLOGY

## 2022-03-01 PROCEDURE — 82962 GLUCOSE BLOOD TEST: CPT | Mod: 91

## 2022-03-01 PROCEDURE — 258N000003 HC RX IP 258 OP 636: Performed by: ANESTHESIOLOGY

## 2022-03-01 PROCEDURE — 250N000013 HC RX MED GY IP 250 OP 250 PS 637: Performed by: NURSE ANESTHETIST, CERTIFIED REGISTERED

## 2022-03-01 PROCEDURE — 370N000017 HC ANESTHESIA TECHNICAL FEE, PER MIN: Performed by: OTOLARYNGOLOGY

## 2022-03-01 PROCEDURE — 250N000024 HC ISOFLURANE, PER MIN: Performed by: OTOLARYNGOLOGY

## 2022-03-01 PROCEDURE — 250N000013 HC RX MED GY IP 250 OP 250 PS 637: Performed by: ANESTHESIOLOGY

## 2022-03-01 PROCEDURE — 999N000141 HC STATISTIC PRE-PROCEDURE NURSING ASSESSMENT: Performed by: OTOLARYNGOLOGY

## 2022-03-01 PROCEDURE — 250N000011 HC RX IP 250 OP 636: Performed by: STUDENT IN AN ORGANIZED HEALTH CARE EDUCATION/TRAINING PROGRAM

## 2022-03-01 PROCEDURE — 88305 TISSUE EXAM BY PATHOLOGIST: CPT | Mod: TC | Performed by: OTOLARYNGOLOGY

## 2022-03-01 PROCEDURE — 82962 GLUCOSE BLOOD TEST: CPT

## 2022-03-01 PROCEDURE — 250N000009 HC RX 250: Performed by: OTOLARYNGOLOGY

## 2022-03-01 PROCEDURE — 710N000012 HC RECOVERY PHASE 2, PER MINUTE: Performed by: OTOLARYNGOLOGY

## 2022-03-01 PROCEDURE — 250N000009 HC RX 250: Performed by: STUDENT IN AN ORGANIZED HEALTH CARE EDUCATION/TRAINING PROGRAM

## 2022-03-01 PROCEDURE — 258N000003 HC RX IP 258 OP 636: Performed by: STUDENT IN AN ORGANIZED HEALTH CARE EDUCATION/TRAINING PROGRAM

## 2022-03-01 PROCEDURE — 272N000001 HC OR GENERAL SUPPLY STERILE: Performed by: OTOLARYNGOLOGY

## 2022-03-01 PROCEDURE — 84132 ASSAY OF SERUM POTASSIUM: CPT | Performed by: ANESTHESIOLOGY

## 2022-03-01 PROCEDURE — 250N000011 HC RX IP 250 OP 636: Performed by: OTOLARYNGOLOGY

## 2022-03-01 PROCEDURE — 88305 TISSUE EXAM BY PATHOLOGIST: CPT | Mod: 26 | Performed by: PATHOLOGY

## 2022-03-01 PROCEDURE — 31299 UNLISTED PX ACCESSORY SINUS: CPT | Performed by: OTOLARYNGOLOGY

## 2022-03-01 PROCEDURE — 250N000009 HC RX 250: Performed by: NURSE ANESTHETIST, CERTIFIED REGISTERED

## 2022-03-01 PROCEDURE — 710N000010 HC RECOVERY PHASE 1, LEVEL 2, PER MIN: Performed by: OTOLARYNGOLOGY

## 2022-03-01 RX ORDER — ACETAMINOPHEN 325 MG/1
975 TABLET ORAL ONCE
Status: COMPLETED | OUTPATIENT
Start: 2022-03-01 | End: 2022-03-01

## 2022-03-01 RX ORDER — ONDANSETRON 4 MG/1
4 TABLET, ORALLY DISINTEGRATING ORAL EVERY 30 MIN PRN
Status: DISCONTINUED | OUTPATIENT
Start: 2022-03-01 | End: 2022-03-01 | Stop reason: HOSPADM

## 2022-03-01 RX ORDER — ONDANSETRON 2 MG/ML
4 INJECTION INTRAMUSCULAR; INTRAVENOUS EVERY 30 MIN PRN
Status: DISCONTINUED | OUTPATIENT
Start: 2022-03-01 | End: 2022-03-01 | Stop reason: HOSPADM

## 2022-03-01 RX ORDER — HYDRALAZINE HYDROCHLORIDE 20 MG/ML
2.5-5 INJECTION INTRAMUSCULAR; INTRAVENOUS EVERY 10 MIN PRN
Status: DISCONTINUED | OUTPATIENT
Start: 2022-03-01 | End: 2022-03-01 | Stop reason: HOSPADM

## 2022-03-01 RX ORDER — ATORVASTATIN CALCIUM 10 MG/1
10 TABLET, FILM COATED ORAL EVERY MORNING
Status: CANCELLED | OUTPATIENT
Start: 2022-03-02

## 2022-03-01 RX ORDER — OXYCODONE HYDROCHLORIDE 5 MG/1
5 TABLET ORAL
Status: DISCONTINUED | OUTPATIENT
Start: 2022-03-01 | End: 2022-03-01 | Stop reason: HOSPADM

## 2022-03-01 RX ORDER — AMOXICILLIN 250 MG
1 CAPSULE ORAL 2 TIMES DAILY
Status: CANCELLED | OUTPATIENT
Start: 2022-03-01

## 2022-03-01 RX ORDER — ECHINACEA PURPUREA EXTRACT 125 MG
TABLET ORAL
Qty: 88 ML | Refills: 0 | Status: SHIPPED | OUTPATIENT
Start: 2022-03-01

## 2022-03-01 RX ORDER — PHENYLEPHRINE HCL IN 0.9% NACL 50MG/250ML
.5-1.25 PLASTIC BAG, INJECTION (ML) INTRAVENOUS CONTINUOUS
Status: DISCONTINUED | OUTPATIENT
Start: 2022-03-01 | End: 2022-03-01 | Stop reason: HOSPADM

## 2022-03-01 RX ORDER — CEFTRIAXONE 2 G/1
2 INJECTION, POWDER, FOR SOLUTION INTRAMUSCULAR; INTRAVENOUS EVERY 24 HOURS
Status: DISCONTINUED | OUTPATIENT
Start: 2022-03-01 | End: 2022-03-01 | Stop reason: HOSPADM

## 2022-03-01 RX ORDER — PROPOFOL 10 MG/ML
INJECTION, EMULSION INTRAVENOUS PRN
Status: DISCONTINUED | OUTPATIENT
Start: 2022-03-01 | End: 2022-03-01

## 2022-03-01 RX ORDER — EPINEPHRINE NASAL SOLUTION 1 MG/ML
SOLUTION NASAL PRN
Status: DISCONTINUED | OUTPATIENT
Start: 2022-03-01 | End: 2022-03-01 | Stop reason: HOSPADM

## 2022-03-01 RX ORDER — ONDANSETRON 4 MG/1
4 TABLET, ORALLY DISINTEGRATING ORAL EVERY 6 HOURS PRN
Status: CANCELLED | OUTPATIENT
Start: 2022-03-01

## 2022-03-01 RX ORDER — LIDOCAINE 40 MG/G
CREAM TOPICAL
Status: CANCELLED | OUTPATIENT
Start: 2022-03-01

## 2022-03-01 RX ORDER — OXYCODONE HYDROCHLORIDE 5 MG/1
5 TABLET ORAL EVERY 8 HOURS PRN
Qty: 15 TABLET | Refills: 0 | Status: SHIPPED | OUTPATIENT
Start: 2022-03-01

## 2022-03-01 RX ORDER — OXYMETAZOLINE HYDROCHLORIDE 0.05 G/100ML
SPRAY NASAL
Qty: 37 ML | Refills: 0 | Status: SHIPPED | OUTPATIENT
Start: 2022-03-01

## 2022-03-01 RX ORDER — FENTANYL CITRATE 50 UG/ML
25-50 INJECTION, SOLUTION INTRAMUSCULAR; INTRAVENOUS EVERY 5 MIN PRN
Status: DISCONTINUED | OUTPATIENT
Start: 2022-03-01 | End: 2022-03-01 | Stop reason: HOSPADM

## 2022-03-01 RX ORDER — OXYCODONE HYDROCHLORIDE 5 MG/1
5 TABLET ORAL EVERY 4 HOURS PRN
Status: DISCONTINUED | OUTPATIENT
Start: 2022-03-01 | End: 2022-03-01 | Stop reason: HOSPADM

## 2022-03-01 RX ORDER — SODIUM CHLORIDE, SODIUM LACTATE, POTASSIUM CHLORIDE, CALCIUM CHLORIDE 600; 310; 30; 20 MG/100ML; MG/100ML; MG/100ML; MG/100ML
INJECTION, SOLUTION INTRAVENOUS CONTINUOUS
Status: CANCELLED | OUTPATIENT
Start: 2022-03-01

## 2022-03-01 RX ORDER — ALBUTEROL SULFATE 90 UG/1
AEROSOL, METERED RESPIRATORY (INHALATION) PRN
Status: DISCONTINUED | OUTPATIENT
Start: 2022-03-01 | End: 2022-03-01

## 2022-03-01 RX ORDER — METOPROLOL TARTRATE 25 MG/1
25 TABLET, FILM COATED ORAL 2 TIMES DAILY
Status: CANCELLED | OUTPATIENT
Start: 2022-03-02

## 2022-03-01 RX ORDER — PROCHLORPERAZINE MALEATE 5 MG
10 TABLET ORAL EVERY 6 HOURS PRN
Status: CANCELLED | OUTPATIENT
Start: 2022-03-01

## 2022-03-01 RX ORDER — LABETALOL HYDROCHLORIDE 5 MG/ML
INJECTION, SOLUTION INTRAVENOUS PRN
Status: DISCONTINUED | OUTPATIENT
Start: 2022-03-01 | End: 2022-03-01

## 2022-03-01 RX ORDER — FENTANYL CITRATE 50 UG/ML
INJECTION, SOLUTION INTRAMUSCULAR; INTRAVENOUS PRN
Status: DISCONTINUED | OUTPATIENT
Start: 2022-03-01 | End: 2022-03-01

## 2022-03-01 RX ORDER — LABETALOL HYDROCHLORIDE 5 MG/ML
5-10 INJECTION, SOLUTION INTRAVENOUS
Status: COMPLETED | OUTPATIENT
Start: 2022-03-01 | End: 2022-03-01

## 2022-03-01 RX ORDER — BISACODYL 10 MG
10 SUPPOSITORY, RECTAL RECTAL DAILY PRN
Status: CANCELLED | OUTPATIENT
Start: 2022-03-01

## 2022-03-01 RX ORDER — ALBUTEROL SULFATE 90 UG/1
1-2 AEROSOL, METERED RESPIRATORY (INHALATION) EVERY 4 HOURS PRN
Status: CANCELLED | OUTPATIENT
Start: 2022-03-01

## 2022-03-01 RX ORDER — ACETAMINOPHEN 325 MG/1
650 TABLET ORAL EVERY 4 HOURS PRN
Status: CANCELLED | OUTPATIENT
Start: 2022-03-04

## 2022-03-01 RX ORDER — NICOTINE POLACRILEX 4 MG
15-30 LOZENGE BUCCAL
Status: CANCELLED | OUTPATIENT
Start: 2022-03-01

## 2022-03-01 RX ORDER — DEXTROSE MONOHYDRATE 25 G/50ML
25-50 INJECTION, SOLUTION INTRAVENOUS
Status: CANCELLED | OUTPATIENT
Start: 2022-03-01

## 2022-03-01 RX ORDER — OXYCODONE HYDROCHLORIDE 10 MG/1
10 TABLET ORAL EVERY 4 HOURS PRN
Status: CANCELLED | OUTPATIENT
Start: 2022-03-01

## 2022-03-01 RX ORDER — ACETAMINOPHEN 325 MG/1
650 TABLET ORAL
Status: DISCONTINUED | OUTPATIENT
Start: 2022-03-01 | End: 2022-03-01 | Stop reason: HOSPADM

## 2022-03-01 RX ORDER — HYDROMORPHONE HCL IN WATER/PF 6 MG/30 ML
.2-.4 PATIENT CONTROLLED ANALGESIA SYRINGE INTRAVENOUS EVERY 10 MIN PRN
Status: DISCONTINUED | OUTPATIENT
Start: 2022-03-01 | End: 2022-03-01 | Stop reason: HOSPADM

## 2022-03-01 RX ORDER — OXYMETAZOLINE HYDROCHLORIDE 0.05 G/100ML
SPRAY NASAL PRN
Status: DISCONTINUED | OUTPATIENT
Start: 2022-03-01 | End: 2022-03-01 | Stop reason: HOSPADM

## 2022-03-01 RX ORDER — ESCITALOPRAM OXALATE 20 MG/1
20 TABLET ORAL EVERY MORNING
Status: CANCELLED | OUTPATIENT
Start: 2022-03-02

## 2022-03-01 RX ORDER — OXYCODONE HYDROCHLORIDE 5 MG/1
5 TABLET ORAL EVERY 4 HOURS PRN
Status: CANCELLED | OUTPATIENT
Start: 2022-03-01

## 2022-03-01 RX ORDER — SODIUM CHLORIDE, SODIUM GLUCONATE, SODIUM ACETATE, POTASSIUM CHLORIDE AND MAGNESIUM CHLORIDE 526; 502; 368; 37; 30 MG/100ML; MG/100ML; MG/100ML; MG/100ML; MG/100ML
INJECTION, SOLUTION INTRAVENOUS CONTINUOUS PRN
Status: DISCONTINUED | OUTPATIENT
Start: 2022-03-01 | End: 2022-03-01

## 2022-03-01 RX ORDER — MEPERIDINE HYDROCHLORIDE 25 MG/ML
12.5 INJECTION INTRAMUSCULAR; INTRAVENOUS; SUBCUTANEOUS
Status: DISCONTINUED | OUTPATIENT
Start: 2022-03-01 | End: 2022-03-01 | Stop reason: HOSPADM

## 2022-03-01 RX ORDER — LIDOCAINE HYDROCHLORIDE AND EPINEPHRINE 10; 10 MG/ML; UG/ML
INJECTION, SOLUTION INFILTRATION; PERINEURAL PRN
Status: DISCONTINUED | OUTPATIENT
Start: 2022-03-01 | End: 2022-03-01 | Stop reason: HOSPADM

## 2022-03-01 RX ORDER — EPHEDRINE SULFATE 50 MG/ML
INJECTION, SOLUTION INTRAMUSCULAR; INTRAVENOUS; SUBCUTANEOUS PRN
Status: DISCONTINUED | OUTPATIENT
Start: 2022-03-01 | End: 2022-03-01

## 2022-03-01 RX ORDER — OXYMETAZOLINE HYDROCHLORIDE 0.05 G/100ML
2 SPRAY NASAL
Status: CANCELLED | OUTPATIENT
Start: 2022-03-01

## 2022-03-01 RX ORDER — SODIUM CHLORIDE, SODIUM LACTATE, POTASSIUM CHLORIDE, CALCIUM CHLORIDE 600; 310; 30; 20 MG/100ML; MG/100ML; MG/100ML; MG/100ML
INJECTION, SOLUTION INTRAVENOUS CONTINUOUS
Status: DISCONTINUED | OUTPATIENT
Start: 2022-03-01 | End: 2022-03-01 | Stop reason: HOSPADM

## 2022-03-01 RX ORDER — ACETAMINOPHEN 325 MG/1
650 TABLET ORAL EVERY 4 HOURS PRN
Qty: 50 TABLET | Refills: 0 | Status: SHIPPED | OUTPATIENT
Start: 2022-03-01

## 2022-03-01 RX ORDER — FENTANYL CITRATE 50 UG/ML
25-50 INJECTION, SOLUTION INTRAMUSCULAR; INTRAVENOUS
Status: DISCONTINUED | OUTPATIENT
Start: 2022-03-01 | End: 2022-03-01 | Stop reason: HOSPADM

## 2022-03-01 RX ORDER — HYDROMORPHONE HCL IN WATER/PF 6 MG/30 ML
0.2 PATIENT CONTROLLED ANALGESIA SYRINGE INTRAVENOUS
Status: CANCELLED | OUTPATIENT
Start: 2022-03-01

## 2022-03-01 RX ORDER — LABETALOL HYDROCHLORIDE 5 MG/ML
10 INJECTION, SOLUTION INTRAVENOUS
Status: COMPLETED | OUTPATIENT
Start: 2022-03-01 | End: 2022-03-01

## 2022-03-01 RX ORDER — POLYETHYLENE GLYCOL 3350 17 G/17G
17 POWDER, FOR SOLUTION ORAL DAILY
Status: CANCELLED | OUTPATIENT
Start: 2022-03-02

## 2022-03-01 RX ORDER — ONDANSETRON 4 MG/1
4 TABLET, ORALLY DISINTEGRATING ORAL
Status: DISCONTINUED | OUTPATIENT
Start: 2022-03-01 | End: 2022-03-01 | Stop reason: HOSPADM

## 2022-03-01 RX ORDER — SODIUM CHLORIDE, SODIUM LACTATE, POTASSIUM CHLORIDE, CALCIUM CHLORIDE 600; 310; 30; 20 MG/100ML; MG/100ML; MG/100ML; MG/100ML
INJECTION, SOLUTION INTRAVENOUS CONTINUOUS PRN
Status: DISCONTINUED | OUTPATIENT
Start: 2022-03-01 | End: 2022-03-01

## 2022-03-01 RX ORDER — LIDOCAINE 40 MG/G
CREAM TOPICAL
Status: DISCONTINUED | OUTPATIENT
Start: 2022-03-01 | End: 2022-03-01 | Stop reason: HOSPADM

## 2022-03-01 RX ORDER — ONDANSETRON 2 MG/ML
4 INJECTION INTRAMUSCULAR; INTRAVENOUS EVERY 6 HOURS PRN
Status: CANCELLED | OUTPATIENT
Start: 2022-03-01

## 2022-03-01 RX ORDER — LISINOPRIL 20 MG/1
20 TABLET ORAL EVERY MORNING
Status: CANCELLED | OUTPATIENT
Start: 2022-03-02

## 2022-03-01 RX ORDER — ONDANSETRON 2 MG/ML
INJECTION INTRAMUSCULAR; INTRAVENOUS PRN
Status: DISCONTINUED | OUTPATIENT
Start: 2022-03-01 | End: 2022-03-01

## 2022-03-01 RX ORDER — AMOXICILLIN 250 MG
1-2 CAPSULE ORAL 2 TIMES DAILY
Qty: 30 TABLET | Refills: 0 | Status: SHIPPED | OUTPATIENT
Start: 2022-03-01

## 2022-03-01 RX ORDER — HYDROMORPHONE HCL IN WATER/PF 6 MG/30 ML
0.4 PATIENT CONTROLLED ANALGESIA SYRINGE INTRAVENOUS
Status: CANCELLED | OUTPATIENT
Start: 2022-03-01

## 2022-03-01 RX ORDER — ACETAMINOPHEN 325 MG/1
975 TABLET ORAL EVERY 8 HOURS
Status: CANCELLED | OUTPATIENT
Start: 2022-03-01 | End: 2022-03-04

## 2022-03-01 RX ADMIN — ONDANSETRON 4 MG: 2 INJECTION INTRAMUSCULAR; INTRAVENOUS at 19:36

## 2022-03-01 RX ADMIN — Medication 5 MG: at 13:37

## 2022-03-01 RX ADMIN — LABETALOL HYDROCHLORIDE 10 MG: 5 INJECTION, SOLUTION INTRAVENOUS at 18:47

## 2022-03-01 RX ADMIN — ONDANSETRON 4 MG: 2 INJECTION INTRAMUSCULAR; INTRAVENOUS at 17:58

## 2022-03-01 RX ADMIN — HYDRALAZINE HYDROCHLORIDE 5 MG: 20 INJECTION INTRAMUSCULAR; INTRAVENOUS at 18:37

## 2022-03-01 RX ADMIN — ACETAMINOPHEN 975 MG: 325 TABLET ORAL at 12:00

## 2022-03-01 RX ADMIN — ALBUTEROL SULFATE 6 PUFF: 108 INHALANT RESPIRATORY (INHALATION) at 18:04

## 2022-03-01 RX ADMIN — HYDRALAZINE HYDROCHLORIDE 5 MG: 20 INJECTION INTRAMUSCULAR; INTRAVENOUS at 21:37

## 2022-03-01 RX ADMIN — PROPOFOL 150 MG: 10 INJECTION, EMULSION INTRAVENOUS at 12:46

## 2022-03-01 RX ADMIN — SODIUM CHLORIDE, SODIUM GLUCONATE, SODIUM ACETATE, POTASSIUM CHLORIDE AND MAGNESIUM CHLORIDE: 526; 502; 368; 37; 30 INJECTION, SOLUTION INTRAVENOUS at 17:30

## 2022-03-01 RX ADMIN — Medication 0.5 MCG/KG/MIN: at 15:19

## 2022-03-01 RX ADMIN — Medication 5 MG: at 14:44

## 2022-03-01 RX ADMIN — PROCHLORPERAZINE EDISYLATE 5 MG: 5 INJECTION INTRAMUSCULAR; INTRAVENOUS at 18:51

## 2022-03-01 RX ADMIN — Medication 10 MG: at 13:03

## 2022-03-01 RX ADMIN — CEFTRIAXONE SODIUM 2 G: 2 INJECTION, POWDER, FOR SOLUTION INTRAMUSCULAR; INTRAVENOUS at 12:48

## 2022-03-01 RX ADMIN — HYDRALAZINE HYDROCHLORIDE 5 MG: 20 INJECTION INTRAMUSCULAR; INTRAVENOUS at 21:27

## 2022-03-01 RX ADMIN — HYDRALAZINE HYDROCHLORIDE 5 MG: 20 INJECTION INTRAMUSCULAR; INTRAVENOUS at 21:18

## 2022-03-01 RX ADMIN — ROCURONIUM BROMIDE 20 MG: 50 INJECTION, SOLUTION INTRAVENOUS at 14:26

## 2022-03-01 RX ADMIN — PROPOFOL 30 MG: 10 INJECTION, EMULSION INTRAVENOUS at 14:24

## 2022-03-01 RX ADMIN — FENTANYL CITRATE 150 MCG: 50 INJECTION, SOLUTION INTRAMUSCULAR; INTRAVENOUS at 12:46

## 2022-03-01 RX ADMIN — LABETALOL HYDROCHLORIDE 10 MG: 5 INJECTION INTRAVENOUS at 18:13

## 2022-03-01 RX ADMIN — Medication 10 MG: at 12:54

## 2022-03-01 RX ADMIN — ROCURONIUM BROMIDE 10 MG: 50 INJECTION, SOLUTION INTRAVENOUS at 17:03

## 2022-03-01 RX ADMIN — SODIUM CHLORIDE, POTASSIUM CHLORIDE, SODIUM LACTATE AND CALCIUM CHLORIDE: 600; 310; 30; 20 INJECTION, SOLUTION INTRAVENOUS at 12:37

## 2022-03-01 RX ADMIN — FENTANYL CITRATE 50 MCG: 50 INJECTION, SOLUTION INTRAMUSCULAR; INTRAVENOUS at 12:59

## 2022-03-01 RX ADMIN — SODIUM CHLORIDE, POTASSIUM CHLORIDE, SODIUM LACTATE AND CALCIUM CHLORIDE: 600; 310; 30; 20 INJECTION, SOLUTION INTRAVENOUS at 12:55

## 2022-03-01 RX ADMIN — Medication 5 MG: at 14:40

## 2022-03-01 RX ADMIN — LABETALOL HYDROCHLORIDE 10 MG: 5 INJECTION, SOLUTION INTRAVENOUS at 21:16

## 2022-03-01 RX ADMIN — FENTANYL CITRATE 50 MCG: 50 INJECTION, SOLUTION INTRAMUSCULAR; INTRAVENOUS at 17:58

## 2022-03-01 RX ADMIN — ROCURONIUM BROMIDE 50 MG: 50 INJECTION, SOLUTION INTRAVENOUS at 12:47

## 2022-03-01 RX ADMIN — Medication 5 MG: at 14:53

## 2022-03-01 RX ADMIN — SODIUM CHLORIDE, POTASSIUM CHLORIDE, SODIUM LACTATE AND CALCIUM CHLORIDE: 600; 310; 30; 20 INJECTION, SOLUTION INTRAVENOUS at 13:58

## 2022-03-01 RX ADMIN — HYDRALAZINE HYDROCHLORIDE 5 MG: 20 INJECTION INTRAMUSCULAR; INTRAVENOUS at 19:24

## 2022-03-01 RX ADMIN — HYDRALAZINE HYDROCHLORIDE 5 MG: 20 INJECTION INTRAMUSCULAR; INTRAVENOUS at 21:47

## 2022-03-01 RX ADMIN — SUGAMMADEX 200 MG: 100 INJECTION, SOLUTION INTRAVENOUS at 18:08

## 2022-03-01 RX ADMIN — LABETALOL HYDROCHLORIDE 10 MG: 5 INJECTION INTRAVENOUS at 18:17

## 2022-03-01 NOTE — ANESTHESIA PROCEDURE NOTES
Airway       Patient location during procedure: OR       Procedure Start/Stop Times: 3/1/2022 12:53 PM  Staff -        Anesthesiologist:  Alexey Rogers MD       Resident/Fellow: Kenneth Lord MD       Performed By: resident  Consent for Airway        Urgency: elective  Indications and Patient Condition       Indications for airway management: jessica-procedural       Induction type:intravenous       Mask difficulty assessment: 2 - vent by mask + OA or adjuvant +/- NMBA    Final Airway Details       Final airway type: endotracheal airway       Successful airway: ETT - single and Oral  Endotracheal Airway Details        ETT size (mm): 7.5       Cuffed: yes       Successful intubation technique: video laryngoscopy       VL Blade Size: MAC 3       Grade View of Cords: 1       Adjucts: stylet       Position: Left       Measured from: gums/teeth       Secured at (cm): 23       Bite block used: Soft    Post intubation assessment        Placement verified by: capnometry, equal breath sounds and chest rise        Number of attempts at approach: 1       Number of other approaches attempted: 0       Secured with: pink tape       Ease of procedure: easy       Dentition: Intact and Unchanged    Additional Comments       Grade 1 View, w/ DL as well.

## 2022-03-02 ENCOUNTER — PATIENT OUTREACH (OUTPATIENT)
Dept: OTOLARYNGOLOGY | Facility: CLINIC | Age: 42
End: 2022-03-02
Payer: COMMERCIAL

## 2022-03-02 NOTE — BRIEF OP NOTE
[facility]    Brief Operative Note    Pre-operative diagnosis: Osteoma of paranasal sinus [D16.4]  Post-operative diagnosis Same as pre-operative diagnosis    Procedure: Procedure(s):  Endoscopic Endonasal Image-Guided Resection of Skull Base Osteoma  Surgeon: Surgeon(s) and Role:     * Bry Garcia MD - Primary     * Yang Cooney MD - Resident - Assisting  Anesthesia: General   Estimated Blood Loss: Less than 50 ml    Drains: None  Specimens:   ID Type Source Tests Collected by Time Destination   1 : Frontal Sinus Tumor  Tissue Sinus SURGICAL PATHOLOGY EXAM Bry Garcia MD 3/1/2022  5:51 PM      Findings:   None.  Complications: None.  Implants: * No implants in log *

## 2022-03-02 NOTE — PROGRESS NOTES
RN RECOVERY NOTE   Assigned as pt nurse while pt in pacu post procedure   Pt in phase II when I was assigned to take over care   Report from Uma RN @ pt chairside @ 2025  All filled scripts are with pt to take upon departure  Informed  in pacu   Informed that Anesthesia notified about BP. Goal is BP less than 180  NST assist pt with dressing, transferring to chair and obtaining VS.   All belongings returned to pt at this time. No report of any items missing.   Pt tolerating PO ice chips while in Phase II without difficulty   Dr. Hanson called and notified that pt SBP over 180. BP cuff repositioned twice and 2 different cuff sizes used. Both SBPs greater than 190. Pt denies pain and has voided twice post op. Pt states SBP is normally 150. States he will place orders.   Pt SBP less than 160 prior to discharge with use of medications    @ pt side in phase II  Discharge instructions printed and provided for pt to take on departure. Instructions reviewed verbally with pt and spouse. Education provided Questions encouraged and answered.   Written instructions and medication provided for  to take upon pt discharge  PIV removed   NST takes pt down to [facility] via wheelchair

## 2022-03-02 NOTE — ANESTHESIA CARE TRANSFER NOTE
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    Anesthesia Type:   General     Note:    Oropharynx: oropharynx clear of all foreign objects  Level of Consciousness: awake  Oxygen Supplementation: face mask    Independent Airway: airway patency satisfactory and stable  Dentition: dentition unchanged  Vital Signs Stable: post-procedure vital signs reviewed and stable  Report to RN Given: handoff report given  Patient transferred to: PACU    Handoff Report: Identifed the Patient, Identified the Reponsible Provider, Reviewed the pertinent medical history, Discussed the surgical course, Reviewed Intra-OP anesthesia mangement and issues during anesthesia, Set expectations for post-procedure period and Allowed opportunity for questions and acknowledgement of understanding      Vitals:  Vitals Value Taken Time   BP     Temp     Pulse     Resp     SpO2         Electronically Signed By: AURELIANO Santos CRNA  March 1, 2022  6:27 PM

## 2022-03-02 NOTE — DISCHARGE INSTRUCTIONS
[facility]  Same-Day Surgery   Adult Discharge Orders & Instructions     For 24 hours after surgery    1. Get plenty of rest.  A responsible adult must stay with you for at least 24 hours after you leave the hospital.   2. Do not drive or use heavy equipment.  If you have weakness or tingling, don't drive or use heavy equipment until this feeling goes away.  3. Do not drink alcohol.  4. Avoid strenuous or risky activities.  Ask for help when climbing stairs.   5. You may feel lightheaded.  IF so, sit for a few minutes before standing.  Have someone help you get up.   6. If you have nausea (feel sick to your stomach): Drink only clear liquids such as apple juice, ginger ale, broth or 7-Up.  Rest may also help.  Be sure to drink enough fluids.  Move to a regular diet as you feel able.  7. You may have a slight fever. Call the doctor if your fever is over 100 F (37.7 C) (taken under the tongue) or lasts longer than 24 hours.  8. You may have a dry mouth, a sore throat, muscle aches or trouble sleeping.  These should go away after 24 hours.  9. Do not make important or legal decisions.   Call your doctor for any of the followin.  Signs of infection (fever, growing tenderness at the surgery site, a large amount of drainage or bleeding, severe pain, foul-smelling drainage, redness, swelling).    2. It has been over 8 to 10 hours since surgery and you are still not able to urinate (pass water).    3.  Headache for over 24 hours.    4.  Numbness, tingling or weakness the day after surgery (if you had spinal anesthesia).  To contact a doctor, call:      [phone] and ask for the resident on call for ENT (answered 24 hours a day)      Emergency Department:    [facility]: [phone]       (TTY for hearing impaired: [phone])    [facility]: [phone]       (TTY for hearing impaired: [phone])

## 2022-03-02 NOTE — PROGRESS NOTES
Responsible Attending Physician: Miguel Garcia  Date of Discharge: 03/01/2022   Discharge to:  Home     Current Status:  Pt is a 41 y/o female s/p Endoscopic Endonasal Image-Guided Resection of Skull Base Osteoma on 03/01/2022.  Reports pre-op symptoms improved.   Operative  pain is decreasing.  Ambulating without assistance.  Pain well controlled with current meds, ample supply. Denies redness, swelling, increased tenderness, or elevated temp.  Denies current bowel or bladder issues.  No visible sutures/staples in place.        Discharge instructions and medication use were reviewed.  RN triage/on call number given:  [phone] or after hours and w/e - [phone].  Patient verbalized understanding and agreement with current plan.     Follow up appointments: 03/09/2022    Writer called and LM for patient to follow-up.    Therese Rollins RN on 3/2/2022 at 3:20 PM

## 2022-03-02 NOTE — OP NOTE
Procedure Date: 03/01/2022    PREOPERATIVE DIAGNOSES:    1.  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  Obstructive right frontal sinusitis, chronic.  3.  Atypical facial pain and dizziness.    POSTOPERATIVE DIAGNOSES:    1.  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  Obstructive right frontal sinusitis, chronic.  3.  Atypical facial pain and dizziness.    PROCEDURE PERFORMED:    1.  Endoscopic endonasal resection of extradural anterior cranial fossa tumor, likely osteoma.    2.  Computerized image guidance, extradural.    INDICATIONS FOR PROCEDURE:  Shauna Mobley is a 42-year-old woman with diabetes, as well as coronary artery disease, morbid obesity, who came to my clinic with incidental findings of frontal sinonasal loss, likely sinonasal tumor obstructing the frontal outflow on the right side.  She presented with atypical facial pain, facial pressure, localizing over the right frontal sinus, and dizziness for several weeks.  Imaging workup demonstrated the presence of a fairly sizable right sided sinonasal mass obstructing the frontal outflow consistent with an extra osteoma.  She was a candidate for endonasal surgery as listed above.      We discussed the relative risks of surgery at length including, but not limited to pain, bleeding, infection, need for additional procedures, CSF leak, Draf III stenosis, orbital injury, amongst other risks.  She was allowed to ask a number of insightful questions, which I did answer to the best of my ability.  After this discussion, written informed consent was obtained.    DESCRIPTION OF PROCEDURE IN DETAIL:  The patient was identified in the preoperative holding area where consent was confirmed.  She was subsequently brought back to the operating room where general anesthesia was induced and she was intubated without issue.  The eyes were protected.  Timeout was performed.  All were in agreement.  She was subsequently turned 180 degrees and prepped and draped in  standard fashion in preparation for endoscopic sinonasal surgery.  We performed computerized a contour-based image guidance registration using Zouxiu image guidance registration platform.  Several landmarks across the facial skeleton were used to perform good target registration with minimal error.  This was used throughout the case to confirm important landmarks including the location of our Draf III frontal sinusotomy, the skull base, and the orbit, which was especially important in this patient who was having an extradural tumor removed from the sinonasal space.      We started the procedure on the left side.  The bilateral nasal cavities were decongested with 1:100,000 Afrin-soaked pledgets.  We started the procedure on the left side.  Relaxing incision was made in the basal lamella using a Baldwin elevator.  We performed a maxillary antrostomy using a pediatric backbiter, as well as straight Thru-Cutting forceps, as well as a microdebrider.  We then performed a total ethmoidectomy.  The bulla ethmoidalis, basal lamella, as well as several posterior ethmoid partitions were resected using a combination of pediatric straight through-cutting angled through-cutting forceps as well as microdebrider.  The posterior turbinate skull base was identified and confirmed using image guidance.  We continued our dissection anteriorly, removing additional partitions off the ethmoid roof skull base.  We identified the frontal recess draining medial to the superior aspect of the uncinate process.  There was a supra agger cell and pneumatized into the frontal sinus that was outfractured and then resected using a combination of upbiting giraffe forceps as well as frontal Hosemann punch.  The frontal sinus ostium was widely opened using a combination of frontal mushroom, as well as frontal Kerrison rongeur.  Epinephrine-soaked pledgets were placed in the left sinonasal cavity.      We turned our attention to the right side and  performed endoscopic sinus surgery in a similar fashion, the middle turbinate was medialized.  A relaxing incision in the basal lamella was performed.  We performed a maxillary antrostomy using a pediatric backbiter, as well as straight Thru-Cutting forceps and microdebrider.  A total ethmoidectomy was then performed in a similar fashion, the left side.  The bulla ethmoidalis, basal lamella, as well as several posterior ethmoid partitions were resected using a combination of angled through-cutting forceps, as well as microdebrider.  We did our dissection anteriorly, removing additional partitions off the ethmoid roof skull base.  In the frontal recess, we visualized the lower inferior portion of the sinonasal mass, which was consistent with an osteoma with this tumor required extensive outside in Draf III frontal sinusotomy in order to complete tumor resection and so we endeavored this.    A mucosal flap was harvested over the lateral nasal wall on the right side using the Bovie electrocautery.  In a similar fashion on the left side, septal flap was raised inferiorly off the cartilage of the anterior septum.  In a similar fashion, this was performed, the left side.  The mucosa on the nasal frontal beak was re-elevated on the left side using a caudal elevator until we identified the first olfactory filum on the left side.  In a similar fashion on the right side, the mucosa of the nasal frontal beak was elevated off the nasal frontal beak using a caudal elevator until we identified the first olfactory filum.  An anterior septotomy was performed using a caudal elevator, as well as straight Thru-Cutting forceps.     We then performed at an outside-in Draf III frontal sinusotomy.  The nasal frontal beak was drilled using a high-speed drill until we entered the right frontal sinus.  There was edematous mucosa.  We continued this until we drilled into the left side and identified the left frontal sinus.  The two frontal  sinuses were connected and the floor was drilled using a high-speed drill.  We did our dissection laterally on the left side, drilling out the nasal frontal beak until we connected the frontal sinus on the left side with the frontal recess on the left side.  This was completed using angled through-cutting forceps, as well as a high-speed drill.  On the right side, we skeletonized the osteoma anteriorly and laterally and inferiorly.  The floor of the frontal sinus was drilled just medial to the aspect of the sinonasal tumor using a high-speed drill.  Laterally, the lateral wall of the nasal cavity was drilled around the osteoma to better out flank the tumor.  The anterior face of the osteoma was visualized.  We then drilled using a series of drilling within the sinonasal tumor and then using a series of Kerrison 2 rongeur, the ostium was drilled and then fractured in pieces and then sent on the back table for specimen analysis.  There was a posterior superior portion, which was just adjacent to the cribriform plate and posterior table of the frontal sinus, which were careful not to traumatize so as to prevent a CSF leak.  The bony tumor was then drilled superiorly towards the skull base and towards the ethmoid roof.  It was then fractured using a Kerrison 2 rongeur and then we were able to use a caudal elevator to down fracture it away from the cribriform plate superiorly and medially on the right frontal outflow.      The tumor was then removed from the nasal cavity using Blakesley forceps.  The sinonasal cavity was irrigated.  I inspected the bilateral cribriform plate and there was no evidence of CSF leak after several Valsalva maneuvers were performed.  Meticulous hemostasis was ensured.  I then harvested a free mucosal graft from the floor of the right nasal cavity.  This mucosal site was marked and this was placed over the nasofrontal beak where there was exposed bone.  I placed a NasoPore sponge into the  middle meatus bilaterally and then fashioned a Silastic sheath was then placed around Draf III frontal ostium.  This was then bolstered in place using a NasoPore sponge.  I placed an orogastric tube in the stomach and the contents were suctioned.  I did call the patient's  and updated her regarding the outcome of surgery.  After the patient was successfully extubated in uneventful fashion and transferred to the PACU in stable condition.    Bry Garcia MD        D: 2022   T: 2022   MT: [id number]    Name:     SEGUNDO LEMONS  MRN:      [id number]        Account:        [id number]   :      1980           Procedure Date: 2022     Document: [id number]

## 2022-03-02 NOTE — OR NURSING
Dr. Torres at bedside, aware BP's 190'. Per [facility] goal is to have systolics 180>   Continue to utilize labetolol and hydralazine as indicated.     ENT Dr. Cooney at bedside, will place obs orders if needed but plan on discharging if patient is able.

## 2022-03-02 NOTE — ANESTHESIA POSTPROCEDURE EVALUATION
Patient: Shauna Mobley    Procedure: Procedure(s):  Endoscopic Endonasal Image-Guided Resection of Skull Base Osteoma       Anesthesia Type:  General    Note:  Disposition: Inpatient   Postop Pain Control: Uneventful            Sign Out: Well controlled pain   PONV: Yes            Sign Out: PONV/POV resolved with treatment   Neuro/Psych: Uneventful            Sign Out: Acceptable/Baseline neuro status   Airway/Respiratory: Uneventful            Sign Out: Acceptable/Baseline resp. status   CV/Hemodynamics: Uneventful            Sign Out: Acceptable CV status; No obvious hypovolemia; No obvious fluid overload   Other NRE: NONE   DID A NON-ROUTINE EVENT OCCUR? No           Last vitals:  Vitals Value Taken Time   /85 03/01/22 1951   Temp 36.8  C (98.2  F) 03/01/22 1830   Pulse 84 03/01/22 1954   Resp 25 03/01/22 1930   SpO2 93 % 03/01/22 1953   Vitals shown include unvalidated device data.    Electronically Signed By: Ruiz Torres MD  March 1, 2022  8:02 PM

## 2022-03-07 LAB
PATH REPORT.COMMENTS IMP SPEC: NORMAL
PATH REPORT.COMMENTS IMP SPEC: NORMAL
PATH REPORT.FINAL DX SPEC: NORMAL
PATH REPORT.GROSS SPEC: NORMAL
PATH REPORT.MICROSCOPIC SPEC OTHER STN: NORMAL
PATH REPORT.RELEVANT HX SPEC: NORMAL
PHOTO IMAGE: NORMAL

## 2022-03-08 NOTE — PROGRESS NOTES
"  [facility]  Return Visit  Encounter date:   March 9, 2022    Chief Complaint:   Post surgical follow-up     ID: s/p ESS resection of osteoma 3/1/22    Interval History:   Shauna Mobley is a 42 year old female with history of diabetes and CAD who initially presented for sinonasal mass that I resected on 3/1/22 and was confirmed to be osteoma who presents for post-op follow-up.    She tells me she is doing well post surgery without any major bleeding or other complaints.     Sino-Nasal Outcome Test (SNOT - 22)  DNT    [facility]  Procedure Date: 03/01/2022     PREOPERATIVE DIAGNOSES:    1.  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  Obstructive right frontal sinusitis, chronic.  3.  Atypical facial pain and dizziness.     POSTOPERATIVE DIAGNOSES:    1.  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  Obstructive right frontal sinusitis, chronic.  3.  Atypical facial pain and dizziness.     PROCEDURE PERFORMED:    1.  Endoscopic endonasal resection of extradural anterior cranial fossa tumor, likely osteoma.    2.  Computerized image guidance, extradural.    Review of systems: A 14-point review of systems has been conducted and is negative for any notable symptoms, except as dictated in the history of present illness.     Physical Exam:  Vital signs: BP (!) 158/103 (BP Location: Left arm, Patient Position: Sitting, Cuff Size: Adult Regular)   Pulse 78   Temp 97.8  F (36.6  C) (Temporal)   Ht 1.549 m (5' 1\")   Wt 82.6 kg (182 lb)   SpO2 98%   BMI 34.39 kg/m     General Appearance: No acute distress, appropriate demeanor, conversant  Eyes: moist conjunctivae; EOMI; pupils symmetric; visual acuity grossly intact; no proptosis  Head: normocephalic; overall symmetric appearance without deformity  Face: overall symmetric without deformity; HB I/VI  Nose: No external deformity; debridement performed   Lungs: symmetric chest rise; no wheezing  CV: Good distal perfusion; normal hear " rate  Extremities: No deformity  Neurologic Exam: Cranial nerves II-XII are grossly intact; no focal deficit      Procedure Note  Procedure performed: Rigid nasal endoscopy with bilateral debridement  Indication: To evaluate for sinonasal pathology not visualized on routine anterior rhinoscopy  Anesthesia: 4% topical lidocaine with 0.05 % oxymetazoline  Description of procedure: A 30 degree, 3 mm rigid endoscope was inserted into bilateral nasal cavities and the nasal valves, nasal cavity, middle meatus, sphenoethmoid recess, nasopharynx were evaluated for evidence of obstruction, edema, purulence, polyps and/or mass/lesion.     I then used a combination of non-cutting forceps, straight and curved suction to clear bilateral surgical cavities of packing, clot, crust, and fibrinopurulent debris.      Findings:   Post surgical nasal cavity healing as expected.   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 She is doing well. Start saline rinses and follow up in 7-10 days. Need to ensure patency of Draf 3 cavity    Bry Garcia MD    [facility]  Rhinology, Endoscopic Skull Base Surgery  [facility]  Department of Otolaryngology - Head & Neck Surgery    Scribe Disclosure:  IRayshawn, am serving as a scribe to document services personally performed by Bry Garcia MD at this visit, based upon the provider's statements to me. All documentation has been reviewed by the aforementioned provider prior to being entered into the official medical record.      Additional portions of the patient's history have been reviewed below.   ~~~~~~~~~~~~~~~~~~~~~~~~~~~~~~~~~~~~~~~~~~~~~~~~~~~~~~~~~~~~~~~~~~~~~~~~~~~~~~~~~~~~~~~~~~~~~~~~~~~~~~~~~~~~~~~~~~~~~~~~~~~~~~~~~~~~~~~    Past Medical History:   Diagnosis Date     Anxiety      Asthma      Coronary artery disease involving native coronary artery of native heart without angina pectoris      Diabetes mellitus, type 2 (H)      Dyslipidemia      History of coronary angioplasty with insertion of stent      HTN (hypertension)      Obesity         Past Surgical History:   Procedure Laterality Date     ANGIOGRAM  2006    LAD restenosis medical management     CORONARY STENT PLACEMENT  01/01/2001    LAD stenting     GASTRECTOMY LAPAROSCOPIC SLEEVE  07/01/2013     IR MISCELLANEOUS PROCEDURE  11/12/2009     OPTICAL TRACKING SYSTEM ENDOSCOPIC ENDONASAL SURGERY Bilateral 3/1/2022    Procedure: Endoscopic Endonasal Image-Guided Resection of Skull Base Osteoma;  Surgeon: Bry Garcia MD;  Location: [facility], laparoscopic salpingectomy with hysteroscopic polypectomy and novasure endometrial ablation   03/01/2021        Family History   Problem Relation Age of Onset     Anxiety Disorder Mother      Diabetes Mother      Diabetes Father      Hypertension Father      Diabetes Sister      Hypertension Sister      Cerebrovascular Disease Sister      Hypertension Brother      Hypertension Brother      Heart Failure Brother      Anesthesia Reaction No family hx of      Deep Vein Thrombosis (DVT) No family hx of         Social History     Socioeconomic History     Marital status:      Spouse name: Not on file     Number of children: Not on file     Years of education: Not on file     Highest education level: Not on file   Occupational History     Not on file   Tobacco Use     Smoking status: Light Tobacco Smoker     Smokeless tobacco: Never Used     Tobacco comment: 1-2 puffs 5-7 toimes daily   Substance and Sexual Activity      Alcohol use: Yes     Comment: infrequent     Drug use: Not Currently     Sexual activity: Not on file   Other Topics Concern     Parent/sibling w/ CABG, MI or angioplasty before 65F 55M? Not Asked   Social History Narrative     Not on file     Social Determinants of Health     Financial Resource Strain: Not on file   Food Insecurity: Not on file   Transportation Needs: Not on file   Physical Activity: Not on file   Stress: Not on file   Social Connections: Not on file   Intimate Partner Violence: Not on file   Housing Stability: Not on file

## 2022-03-09 ENCOUNTER — OFFICE VISIT (OUTPATIENT)
Dept: OTOLARYNGOLOGY | Facility: CLINIC | Age: 42
End: 2022-03-09
Payer: COMMERCIAL

## 2022-03-09 VITALS
OXYGEN SATURATION: 98 % | DIASTOLIC BLOOD PRESSURE: 103 MMHG | BODY MASS INDEX: 34.36 KG/M2 | HEIGHT: 61 IN | WEIGHT: 182 LBS | TEMPERATURE: 97.8 F | HEART RATE: 78 BPM | SYSTOLIC BLOOD PRESSURE: 158 MMHG

## 2022-03-09 DIAGNOSIS — D16.4 OSTEOMA OF PARANASAL SINUS: Primary | ICD-10-CM

## 2022-03-09 DIAGNOSIS — J32.1 CHRONIC FRONTAL SINUSITIS: ICD-10-CM

## 2022-03-09 PROCEDURE — 99207 PR CDG-PROCEDURE CHARGE ONLY: CPT | Performed by: OTOLARYNGOLOGY

## 2022-03-09 PROCEDURE — 31237 NSL/SINS NDSC SURG BX POLYPC: CPT | Mod: 50 | Performed by: OTOLARYNGOLOGY

## 2022-03-09 ASSESSMENT — PAIN SCALES - GENERAL: PAINLEVEL: NO PAIN (0)

## 2022-03-09 NOTE — NURSING NOTE
"Chief Complaint   Patient presents with     RECHECK     1 week post-op, surgery 3/1    Blood pressure (!) 158/103, pulse 78, temperature 97.8  F (36.6  C), temperature source Temporal, height 1.549 m (5' 1\"), weight 82.6 kg (182 lb), SpO2 98 %, not currently breastfeeding. Uma Cruz, EMT  "

## 2022-03-09 NOTE — LETTER
"3/9/2022       RE: Shauna Mobley  [address] [place] [address]     Dear Colleague,    Thank you for referring your patient, Shauna Mobley, to the [facility] E[facility] NOSE AND TH[facility]AT CLINIC [place] at [facility]. Please see a copy of my visit note below.      [facility]  Return Visit  Encounter date:   March 9, 2022    Chief Complaint:   Post surgical follow-up     ID: s/p ESS resection of osteoma 3/1/22    Interval History:   Shauna Mobley is a 42 year old female with history of diabetes and CAD who initially presented for sinonasal mass that I resected on 3/1/22 and was confirmed to be osteoma who presents for post-op follow-up.    She tells me she is doing well post surgery without any major bleeding or other complaints.     Sino-Nasal Outcome Test (SNOT - 22)  [facility] Operative History  Procedure Date: 03/01/2022     PREOPERATIVE DIAGNOSES:    1.  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  Obstructive right frontal sinusitis, chronic.  3.  Atypical facial pain and dizziness.     POSTOPERATIVE DIAGNOSES:    1.  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  Obstructive right frontal sinusitis, chronic.  3.  Atypical facial pain and dizziness.     PROCEDURE PERFORMED:    1.  Endoscopic endonasal resection of extradural anterior cranial fossa tumor, likely osteoma.    2.  Computerized image guidance, extradural.    Review of systems: A 14-point review of systems has been conducted and is negative for any notable symptoms, except as dictated in the history of present illness.     Physical Exam:  Vital signs: BP (!) 158/103 (BP Location: Left arm, Patient Position: Sitting, Cuff Size: Adult Regular)   Pulse 78   Temp 97.8  F (36.6  C) (Temporal)   Ht 1.549 m (5' 1\")   Wt 82.6 kg (182 lb)   SpO2 98%   BMI 34.39 kg/m     General Appearance: No acute distress, appropriate demeanor, conversant  Eyes: moist conjunctivae; EOMI; pupils " symmetric; visual acuity grossly intact; no proptosis  Head: normocephalic; overall symmetric appearance without deformity  Face: overall symmetric without deformity; HB I/VI  Nose: No external deformity; debridement performed   Lungs: symmetric chest rise; no wheezing  CV: Good distal perfusion; normal hear rate  Extremities: No deformity  Neurologic Exam: Cranial nerves II-XII are grossly intact; no focal deficit      Procedure Note  Procedure performed: Rigid nasal endoscopy with bilateral debridement  Indication: To evaluate for sinonasal pathology not visualized on routine anterior rhinoscopy  Anesthesia: 4% topical lidocaine with 0.05 % oxymetazoline  Description of procedure: A 30 degree, 3 mm rigid endoscope was inserted into bilateral nasal cavities and the nasal valves, nasal cavity, middle meatus, sphenoethmoid recess, nasopharynx were evaluated for evidence of obstruction, edema, purulence, polyps and/or mass/lesion.     I then used a combination of non-cutting forceps, straight and curved suction to clear bilateral surgical cavities of packing, clot, crust, and fibrinopurulent debris.      Findings:   Post surgical nasal cavity healing as expected.   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 She is doing well. Start saline rinses and follow up in 7-10 days. Need to ensure patency of Draf 3 cavity    Bry Garcia MD    [facility]  Rhinology, Endoscopic Skull Base Surgery  [facility]  Department of Otolaryngology - Head & Neck Surgery    Scribe  Disclosure:  I, Rayshawn Rosenbergter, am serving as a scribe to document services personally performed by Bry Garcia MD at this visit, based upon the provider's statements to me. All documentation has been reviewed by the aforementioned provider prior to being entered into the official medical record.     Additional portions of the patient's history have been reviewed below.   ~~~~~~~~~~~~~~~~~~~~~~~~~~~~~~~~~~~~~~~~~~~~~~~~~~~~~~~~~~~~~~~~~~~~~~~~~~~~~~~~~~~~~~~~~~~~~~~~~~~~~~~~~~~~~~~~~~~~~~~~~~~~~~~~~~~~~~~    Past Medical History:   Diagnosis Date     Anxiety      Asthma      Coronary artery disease involving native coronary artery of native heart without angina pectoris      Diabetes mellitus, type 2 (H)      Dyslipidemia      History of coronary angioplasty with insertion of stent      HTN (hypertension)      Obesity         Past Surgical History:   Procedure Laterality Date     ANGIOGRAM  2006    LAD restenosis medical management     CORONARY STENT PLACEMENT  01/01/2001    LAD stenting     GASTRECTOMY LAPAROSCOPIC SLEEVE  07/01/2013     IR MISCELLANEOUS PROCEDURE  11/12/2009     OPTICAL TRACKING SYSTEM ENDOSCOPIC ENDONASAL SURGERY Bilateral 3/1/2022    Procedure: Endoscopic Endonasal Image-Guided Resection of Skull Base Osteoma;  Surgeon: Bry Garcia MD;  Location: [facility], laparoscopic salpingectomy with hysteroscopic polypectomy and novasure endometrial ablation   03/01/2021        Family History   Problem Relation Age of Onset     Anxiety Disorder Mother      Diabetes Mother      Diabetes Father      Hypertension Father      Diabetes Sister      Hypertension Sister      Cerebrovascular Disease Sister      Hypertension Brother      Hypertension Brother      Heart Failure Brother      Anesthesia Reaction No family hx of      Deep Vein Thrombosis (DVT) No family hx of         Social History     Socioeconomic History     Marital status:      Spouse name: Not on file     Number of  children: Not on file     Years of education: Not on file     Highest education level: Not on file   Occupational History     Not on file   Tobacco Use     Smoking status: Light Tobacco Smoker     Smokeless tobacco: Never Used     Tobacco comment: 1-2 puffs 5-7 toimes daily   Substance and Sexual Activity     Alcohol use: Yes     Comment: infrequent     Drug use: Not Currently     Sexual activity: Not on file   Other Topics Concern     Parent/sibling w/ CABG, MI or angioplasty before 65F 55M? Not Asked   Social History Narrative     Not on file     Social Determinants of Health     Financial Resource Strain: Not on file   Food Insecurity: Not on file   Transportation Needs: Not on file   Physical Activity: Not on file   Stress: Not on file   Social Connections: Not on file   Intimate Partner Violence: Not on file   Housing Stability: Not on file

## 2022-03-09 NOTE — PATIENT INSTRUCTIONS
"1. You were seen in the clinic today by Dr. Garcia. If you have any questions or concerns after your appointment, please call the clinic at [phone]. Press \"1\" for scheduling, press \"3\" for nurse advice.    2.   The following has been recommended for you based upon your appointment today:   -Start saline rinses, twice daily.   -Aquaphor ointment to the nasal cavity with a Q-tip, twice daily.    3.   Plan to return the clinic in 1 week.       Michelle Griffin LPN  [facility]  Department of Otolaryngology  [phone]        Cleaning of the Nasal or Sinus Cavity    Please follow all three steps.  Nasal irrigation (cleaning) should be done 2 times/day.    Preparation:  1.  Wash your hands  2.  We suggest that you buy the [employer] Sinus Rinse Kit  3.  Use distilled water or tap water that has been boiled and brought to room temperature.  4.  Fill the irrigation bottle with room temperature water (distilled or boiled tap water) and add mixture (pre made packet or homemade recipe).        If you wish to make a homemade recipe:        Mix 1/4 teaspoon salt with 1/4 teaspoon baking soda in each bottle.    Cleansing Irrigation/Sinus Rinse:    1.  Lean forward over a sink and insert the rinse bottle applicator into the right side of your nose.    2.  Tilt head down and to the left side.  With your mouth open (breathing through your mouth), gently direct the water around the inside of your nose until clear fluid starts to drain from the opposite nostril.  This is called flushing or irrigation.    3.  When you have used 1/4 to 1/2 or the solution, switch to the left nostril.    4.  To irrigate the left nostril, tilt your head down and to the right side.  With your mouth open (breathing through your mouth),  gently direct the water around the inside of your nose until clear fluid starts to drain from the opposite nostril.     Cleaning the Equipment:  1.  Throw away any leftover solution  2.  Clean the rinse bottle and cap " with clear water. Air dry.      Call the ENT Clinic if you have any questions or concerns at [phone]

## 2022-03-16 ENCOUNTER — OFFICE VISIT (OUTPATIENT)
Dept: OTOLARYNGOLOGY | Facility: CLINIC | Age: 42
End: 2022-03-16
Payer: COMMERCIAL

## 2022-03-16 VITALS
DIASTOLIC BLOOD PRESSURE: 98 MMHG | BODY MASS INDEX: 31.94 KG/M2 | HEART RATE: 68 BPM | TEMPERATURE: 96.9 F | SYSTOLIC BLOOD PRESSURE: 168 MMHG | WEIGHT: 173.6 LBS | HEIGHT: 62 IN | OXYGEN SATURATION: 98 %

## 2022-03-16 DIAGNOSIS — D16.4 OSTEOMA OF PARANASAL SINUS: Primary | ICD-10-CM

## 2022-03-16 PROCEDURE — 99207 PR CDG-PROCEDURE CHARGE ONLY: CPT | Performed by: OTOLARYNGOLOGY

## 2022-03-16 PROCEDURE — 31237 NSL/SINS NDSC SURG BX POLYPC: CPT | Mod: 50 | Performed by: OTOLARYNGOLOGY

## 2022-03-16 ASSESSMENT — PAIN SCALES - GENERAL: PAINLEVEL: MODERATE PAIN (4)

## 2022-03-16 NOTE — LETTER
3/16/2022       RE: Shauna Mobley  [address] [place] [address]     Dear Colleague,    Thank you for referring your patient, Shauna Mobley, to the [facility] EAR NOSE AND THROAT CLINIC [place] at [facility]. Please see a copy of my visit note below.      [facility]  Return Visit  Encounter date:   March 16, 2022    Chief Complaint:   Follow-up     ID: s/p endonasal Draf 3 approach for resection of osteoma 3/1/22    Interval History:   Shauna Mobley is a 42 year old female with a history of diabetes and CAD who initially presented for sinonasal mass that I resected on 3/1/22 and was confirmed to be osteoma who presents for post-op follow-up. The patient was last seen in clinic on 03/09/2022 and she was doing well at that time.     Today, the patient reports she has been having productive rinses. She states she is able to breathe better and feels less dry after doing them. The patient reports her sense of smell has not returned. She notes she is able to taste salty and sweet, but not flavors. The patient reports this comes and goes.     [facility] Operative [facility]  Procedure Date: 03/01/2022     PREOPERATIVE DIAGNOSES:    1.  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  Obstructive right frontal sinusitis, chronic.  3.  Atypical facial pain and dizziness.     POSTOPERATIVE DIAGNOSES:    1.  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  Obstructive right frontal sinusitis, chronic.  3.  Atypical facial pain and dizziness.     PROCEDURE PERFORMED:    1.  Endoscopic endonasal resection of extradural anterior cranial fossa tumor, likely osteoma.    2.  Computerized image guidance, extradural.    Review of systems: A 14-point review of systems has been conducted and is negative for any notable symptoms, except as dictated in the history of present illness.     Physical Exam:  Vital signs: BP (!) 168/98 (BP Location: Left arm, Patient Position:  "Sitting, Cuff Size: Adult Regular)   Pulse 68   Temp 96.9  F (36.1  C) (Temporal)   Ht 1.562 m (5' 1.5\")   Wt 78.7 kg (173 lb 9.6 oz)   SpO2 98%   BMI 32.27 kg/m     General Appearance: No acute distress, appropriate demeanor, conversant  Eyes: moist conjunctivae; EOMI; pupils symmetric; visual acuity grossly intact; no proptosis  Head: normocephalic; overall symmetric appearance without deformity  Face: overall symmetric without deformity; HB I/VI   Nose: No external deformity; see endoscopy note  Lungs: symmetric chest rise; no wheezing  CV: Good distal perfusion; normal heart rate  Extremities: No deformity  Neurologic Exam: Cranial nerves II-XII are grossly intact; no focal deficit      Procedure Note  Procedure performed: Rigid nasal endoscopy with bilateral debridement  Indication: To evaluate for sinonasal pathology not visualized on routine anterior rhinoscopy  Anesthesia: 4% topical lidocaine with 0.05 % oxymetazoline  Description of procedure: A 30 degree, 3 mm rigid endoscope was inserted into bilateral nasal cavities and the nasal valves, nasal cavity, middle meatus, sphenoethmoid recess, nasopharynx were evaluated for evidence of obstruction, edema, purulence, polyps and/or mass/lesion.     I then used a combination of non-cutting forceps, straight and curved suction to clear bilateral surgical cavities of packing, clot, crust, and fibrinopurulent debris.     Moy-Raymond Endoscopic Scoring System  Endoscopic observation Right Left   Polyps in middle meatus (0 = absent, 1 = restricted to middle meatus, 2 = Beyond middle meatus) 0 0   Discharge (0 = absent, 1 = thin and clear, 2 = thick, purulent) 0 0   Edema (0 = absent, 1 = mild-moderate, 2 = moderate-severe) 1 1   Crusting (0 = absent, 1 = mild-moderate, 2 = moderate-severe) 0 0   Scarring (0= absent, 1 = mild-moderate, 2 = moderate-severe) 0 0   Total 1 1     Findings   Expected postsurgical changes of the surgical cavity     The patient " tolerated the procedure well without complication.     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 We will see her back in 2 weeks to remove. She is otherwise healing well as expected without any evidence of infection or overt stenosis. She can continue rinsing twice daily or more in the meantime. We discussed that she can restart her medications.     Bry Garcia MD    [facility]  Rhinology, Endoscopic Skull Base Surgery  [facility]  Department of Otolaryngology - Head & Neck Surgery    Scribe Disclosure:  I, Nita Zepeda, am serving as a scribe to document services personally performed by Bry Garcia MD at this visit, based upon the provider's statements to me. All documentation has been reviewed by the aforementioned provider prior to being entered into the official medical record.         Additional portions of the patient's history have been reviewed below.   ~~~~~~~~~~~~~~~~~~~~~~~~~~~~~~~~~~~~~~~~~~~~~~~~~~~~~~~~~~~~~~~~~~~~~~~~~~~~~~~~~~~~~~~~~~~~~~~~~~~~~~~~~~~~~~~~~~~~~~~~~~~~~~~~~~~~~~~    Past Medical History:   Diagnosis Date     Anxiety      Asthma      Coronary artery disease involving native coronary artery of native heart without angina pectoris      Diabetes mellitus, type 2 (H)      Dyslipidemia      History of coronary angioplasty with insertion of stent      HTN (hypertension)      Obesity         Past Surgical History:   Procedure Laterality Date     ANGIOGRAM  2006    LAD restenosis medical management     CORONARY STENT PLACEMENT  01/01/2001    LAD stenting     GASTRECTOMY LAPAROSCOPIC SLEEVE   07/01/2013     IR MISCELLANEOUS PROCEDURE  11/12/2009     OPTICAL TRACKING SYSTEM ENDOSCOPIC ENDONASAL SURGERY Bilateral 3/1/2022    Procedure: Endoscopic Endonasal Image-Guided Resection of Skull Base Osteoma;  Surgeon: Bry Garcia MD;  Location: [facility], laparoscopic salpingectomy with hysteroscopic polypectomy and novasure endometrial ablation   03/01/2021        Family History   Problem Relation Age of Onset     Anxiety Disorder Mother      Diabetes Mother      Diabetes Father      Hypertension Father      Diabetes Sister      Hypertension Sister      Cerebrovascular Disease Sister      Hypertension Brother      Hypertension Brother      Heart Failure Brother      Anesthesia Reaction No family hx of      Deep Vein Thrombosis (DVT) No family hx of         Social History     Socioeconomic History     Marital status:      Spouse name: Not on file     Number of children: Not on file     Years of education: Not on file     Highest education level: Not on file   Occupational History     Not on file   Tobacco Use     Smoking status: Light Tobacco Smoker     Smokeless tobacco: Never Used     Tobacco comment: 1-2 puffs 5-7 toimes daily   Substance and Sexual Activity     Alcohol use: Yes     Comment: infrequent     Drug use: Not Currently     Sexual activity: Not on file   Other Topics Concern     Parent/sibling w/ CABG, MI or angioplasty before 65F 55M? Not Asked   Social History Narrative     Not on file     Social Determinants of Health     Financial Resource Strain: Not on file   Food Insecurity: Not on file   Transportation Needs: Not on file   Physical Activity: Not on file   Stress: Not on file   Social Connections: Not on file   Intimate Partner Violence: Not on file   Housing Stability: Not on file       Again, thank you for allowing me to participate in the care of your patient.      Sincerely,    Bry Garcia MD

## 2022-03-16 NOTE — PROGRESS NOTES
"  [facility]  Return Visit  Encounter date:   March 16, 2022    Chief Complaint:   Follow-up     ID: s/p endonasal Draf 3 approach for resection of osteoma 3/1/22    Interval History:   Shauna Mobley is a 42 year old female with a history of diabetes and CAD who initially presented for sinonasal mass that I resected on 3/1/22 and was confirmed to be osteoma who presents for post-op follow-up. The patient was last seen in clinic on 03/09/2022 and she was doing well at that time.     Today, the patient reports she has been having productive rinses. She states she is able to breathe better and feels less dry after doing them. The patient reports her sense of smell has not returned. She notes she is able to taste salty and sweet, but not flavors. The patient reports this comes and goes.     [facility] Operative History  Procedure Date: 03/01/2022     PREOPERATIVE DIAGNOSES:    1.  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  Obstructive right frontal sinusitis, chronic.  3.  Atypical facial pain and dizziness.     POSTOPERATIVE DIAGNOSES:    1.  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  Obstructive right frontal sinusitis, chronic.  3.  Atypical facial pain and dizziness.     PROCEDURE PERFORMED:    1.  Endoscopic endonasal resection of extradural anterior cranial fossa tumor, likely osteoma.    2.  Computerized image guidance, extradural.    Review of systems: A 14-point review of systems has been conducted and is negative for any notable symptoms, except as dictated in the history of present illness.     Physical Exam:  Vital signs: BP (!) 168/98 (BP Location: Left arm, Patient Position: Sitting, Cuff Size: Adult Regular)   Pulse 68   Temp 96.9  F (36.1  C) (Temporal)   Ht 1.562 m (5' 1.5\")   Wt 78.7 kg (173 lb 9.6 oz)   SpO2 98%   BMI 32.27 kg/m     General Appearance: No acute distress, appropriate demeanor, conversant  Eyes: moist conjunctivae; EOMI; pupils symmetric; visual acuity " grossly intact; no proptosis  Head: normocephalic; overall symmetric appearance without deformity  Face: overall symmetric without deformity; HB I/VI   Nose: No external deformity; see endoscopy note  Lungs: symmetric chest rise; no wheezing  CV: Good distal perfusion; normal heart rate  Extremities: No deformity  Neurologic Exam: Cranial nerves II-XII are grossly intact; no focal deficit      Procedure Note  Procedure performed: Rigid nasal endoscopy with bilateral debridement  Indication: To evaluate for sinonasal pathology not visualized on routine anterior rhinoscopy  Anesthesia: 4% topical lidocaine with 0.05 % oxymetazoline  Description of procedure: A 30 degree, 3 mm rigid endoscope was inserted into bilateral nasal cavities and the nasal valves, nasal cavity, middle meatus, sphenoethmoid recess, nasopharynx were evaluated for evidence of obstruction, edema, purulence, polyps and/or mass/lesion.     I then used a combination of non-cutting forceps, straight and curved suction to clear bilateral surgical cavities of packing, clot, crust, and fibrinopurulent debris.     Boring-Raymond Endoscopic Scoring System  Endoscopic observation Right Left   Polyps in middle meatus (0 = absent, 1 = restricted to middle meatus, 2 = Beyond middle meatus) 0 0   Discharge (0 = absent, 1 = thin and clear, 2 = thick, purulent) 0 0   Edema (0 = absent, 1 = mild-moderate, 2 = moderate-severe) 1 1   Crusting (0 = absent, 1 = mild-moderate, 2 = moderate-severe) 0 0   Scarring (0= absent, 1 = mild-moderate, 2 = moderate-severe) 0 0   Total 1 1     Findings   Expected postsurgical changes of the surgical cavity     The patient tolerated the procedure well without complication.     Laboratory Review:  n/a    Imaging Review:  n/a    Pathology Review:  Frontal sinus mass, excision:  -Benign trabecular bone with surrounding sclerotic bone, consistent with osteoma    Assessment/Medical Decision Making:  Sinonasal osteoma s/p resection  3/1/22  Atypical facial pain    Plan:  I performed endoscopy with debridement today and placed a steroid stent into the frontal maryam-ostium. We will see her back in 2 weeks to remove. She is otherwise healing well as expected without any evidence of infection or overt stenosis. She can continue rinsing twice daily or more in the meantime. We discussed that she can restart her medications.     Bry Garcia MD    [facility]  Rhinology, Endoscopic Skull Base Surgery  [facility]  Department of Otolaryngology - Head & Neck Surgery    Scribe Disclosure:  I, Nita Zepeda, am serving as a scribe to document services personally performed by Bry Garcia MD at this visit, based upon the provider's statements to me. All documentation has been reviewed by the aforementioned provider prior to being entered into the official medical record.         Additional portions of the patient's history have been reviewed below.   ~~~~~~~~~~~~~~~~~~~~~~~~~~~~~~~~~~~~~~~~~~~~~~~~~~~~~~~~~~~~~~~~~~~~~~~~~~~~~~~~~~~~~~~~~~~~~~~~~~~~~~~~~~~~~~~~~~~~~~~~~~~~~~~~~~~~~~~    Past Medical History:   Diagnosis Date     Anxiety      Asthma      Coronary artery disease involving native coronary artery of native heart without angina pectoris      Diabetes mellitus, type 2 (H)      Dyslipidemia      History of coronary angioplasty with insertion of stent      HTN (hypertension)      Obesity         Past Surgical History:   Procedure Laterality Date     ANGIOGRAM  2006    LAD restenosis medical management     CORONARY STENT PLACEMENT  01/01/2001    LAD stenting     GASTRECTOMY LAPAROSCOPIC SLEEVE  07/01/2013     IR MISCELLANEOUS PROCEDURE  11/12/2009     OPTICAL TRACKING SYSTEM ENDOSCOPIC ENDONASAL SURGERY Bilateral 3/1/2022    Procedure: Endoscopic Endonasal Image-Guided Resection of Skull Base Osteoma;  Surgeon: Bry Garcia MD;  Location: [facility], laparoscopic salpingectomy with  hysteroscopic polypectomy and novasure endometrial ablation   03/01/2021        Family History   Problem Relation Age of Onset     Anxiety Disorder Mother      Diabetes Mother      Diabetes Father      Hypertension Father      Diabetes Sister      Hypertension Sister      Cerebrovascular Disease Sister      Hypertension Brother      Hypertension Brother      Heart Failure Brother      Anesthesia Reaction No family hx of      Deep Vein Thrombosis (DVT) No family hx of         Social History     Socioeconomic History     Marital status:      Spouse name: Not on file     Number of children: Not on file     Years of education: Not on file     Highest education level: Not on file   Occupational History     Not on file   Tobacco Use     Smoking status: Light Tobacco Smoker     Smokeless tobacco: Never Used     Tobacco comment: 1-2 puffs 5-7 toimes daily   Substance and Sexual Activity     Alcohol use: Yes     Comment: infrequent     Drug use: Not Currently     Sexual activity: Not on file   Other Topics Concern     Parent/sibling w/ CABG, MI or angioplasty before 65F 55M? Not Asked   Social History Narrative     Not on file     Social Determinants of Health     Financial Resource Strain: Not on file   Food Insecurity: Not on file   Transportation Needs: Not on file   Physical Activity: Not on file   Stress: Not on file   Social Connections: Not on file   Intimate Partner Violence: Not on file   Housing Stability: Not on file

## 2022-03-16 NOTE — PATIENT INSTRUCTIONS
"1. You were seen in the clinic today by Dr. Garcia. If you have any questions or concerns after your appointment, please call the clinic at [phone]. Press \"1\" for scheduling, press \"3\" for nurse advice.    2.   The following has been recommended for you based upon your appointment today:   - Continue sinus rinses.    3.   Plan to return the clinic in 2 weeks.       SUNIL Aguilar  [facility]  Department of Otolaryngology  [phone] Anusha Saleh RN direct line    "

## 2022-03-25 ENCOUNTER — OFFICE VISIT (OUTPATIENT)
Dept: OTOLARYNGOLOGY | Facility: CLINIC | Age: 42
End: 2022-03-25
Payer: COMMERCIAL

## 2022-03-25 VITALS
WEIGHT: 173 LBS | BODY MASS INDEX: 31.83 KG/M2 | SYSTOLIC BLOOD PRESSURE: 166 MMHG | HEIGHT: 62 IN | DIASTOLIC BLOOD PRESSURE: 102 MMHG | HEART RATE: 72 BPM | TEMPERATURE: 97.2 F

## 2022-03-25 DIAGNOSIS — D16.4 OSTEOMA OF PARANASAL SINUS: Primary | ICD-10-CM

## 2022-03-25 DIAGNOSIS — J32.1 CHRONIC FRONTAL SINUSITIS: ICD-10-CM

## 2022-03-25 PROCEDURE — 99000 SPECIMEN HANDLING OFFICE-LAB: CPT | Performed by: PATHOLOGY

## 2022-03-25 PROCEDURE — 87186 SC STD MICRODIL/AGAR DIL: CPT | Mod: 90 | Performed by: PATHOLOGY

## 2022-03-25 PROCEDURE — 87075 CULTR BACTERIA EXCEPT BLOOD: CPT | Mod: 90 | Performed by: PATHOLOGY

## 2022-03-25 PROCEDURE — 87070 CULTURE OTHR SPECIMN AEROBIC: CPT | Mod: 90 | Performed by: PATHOLOGY

## 2022-03-25 PROCEDURE — 87077 CULTURE AEROBIC IDENTIFY: CPT | Mod: 90 | Performed by: PATHOLOGY

## 2022-03-25 PROCEDURE — 31237 NSL/SINS NDSC SURG BX POLYPC: CPT | Mod: 50 | Performed by: OTOLARYNGOLOGY

## 2022-03-25 PROCEDURE — 99207 PR CDG-PROCEDURE CHARGE ONLY: CPT | Performed by: OTOLARYNGOLOGY

## 2022-03-25 ASSESSMENT — PAIN SCALES - GENERAL: PAINLEVEL: MODERATE PAIN (5)

## 2022-03-25 NOTE — NURSING NOTE
"Chief Complaint   Patient presents with     RECHECK     symptoms, green brown mucus during sinus rinses, chills, sinus pressure/pain, post op 3/1    Blood pressure (!) 166/102, pulse 72, temperature 97.2  F (36.2  C), temperature source Temporal, height 1.562 m (5' 1.5\"), weight 78.5 kg (173 lb), not currently breastfeeding. Uma Cruz, EMT  "

## 2022-03-25 NOTE — PROGRESS NOTES
[facility]  Return Visit  Encounter date:   March 25, 2022    Chief Complaint: follow up    ID: s/p endonasal Draf 3 approach for resection of osteoma 3/1/22    Interval History:   Shauna Mobley is a 42 year old female, with a history of diabetes and CAD who presents for follow up. I resected a sinonasal mass earlier this month for the patient as noted below. Shauna visited with me 2 weeks ago (03/16/2022) and reported doing her sinus rinses as directed and these have helped her symptoms. Shauna also reported a waxing and waning taste changes and continued anosmia.I placed a steroid stent in the frontal maryam-ostium  She reached out to my team yesterday (03/24/2022) for bloody and brown mucus drainage along with chills (has not checked her temperature), body aches, otalgia, and ear fullness.    Today, the patient presents with her , Tano. They report that she has felt a burning pain sensation on each side of her nose by the corners of her eyes (L > R). She endorses dizziness episodes that she attributes this to her facial pain. Due to this, she has been concerned and not driven her car.    [facility]  Procedure Date: 03/01/2022     PREOPERATIVE DIAGNOSES:    1.  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  Obstructive right frontal sinusitis, chronic.  3.  Atypical facial pain and dizziness.     POSTOPERATIVE DIAGNOSES:    1.  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  Obstructive right frontal sinusitis, chronic.  3.  Atypical facial pain and dizziness.     PROCEDURE PERFORMED:    1.  Endoscopic endonasal resection of extradural anterior cranial fossa tumor, likely osteoma.    2.  Computerized image guidance, extradural.    Surgeon(s) and Role:  Bry Garcia MD - Primary  Chieffe, MD Yang - Resident - Assisting    Review of systems: A 14-point review of systems has been conducted and is negative for any notable symptoms, except as dictated in the history of  "present illness.     Physical Exam:  Vital signs: BP (!) 166/102 (BP Location: Right arm, Patient Position: Sitting, Cuff Size: Adult Regular)   Pulse 72   Temp 97.2  F (36.2  C) (Temporal)   Ht 1.562 m (5' 1.5\")   Wt 78.5 kg (173 lb)   BMI 32.16 kg/m     General Appearance: No acute distress, appropriate demeanor, conversant  Eyes: moist conjunctivae; EOMI; pupils symmetric; visual acuity grossly intact; no proptosis  Head: normocephalic; overall symmetric appearance without deformity  Face: overall symmetric without deformity; HB I/VI   Nose: No external deformity; see endoscopy note  Lungs: symmetric chest rise; no wheezing  CV: Good distal perfusion; normal heart rate  Extremities: No deformity  Neurologic Exam: Cranial nerves II-XII are grossly intact; no focal deficit    Procedure Note  Procedure performed: Rigid nasal endoscopy  Indication: To evaluate for sinonasal pathology not visualized on routine anterior rhinoscopy  Anesthesia: 4% topical lidocaine with 0.05 % oxymetazoline  Description of procedure: A 30 degree, 3 mm rigid endoscope was inserted into bilateral nasal cavities and the nasal valves, nasal cavity, middle meatus, sphenoethmoid recess, nasopharynx were evaluated for evidence of obstruction, edema, purulence, polyps and/or mass/lesion.     I then used a combination of non-cutting forceps, straight and curved suction to clear bilateral surgical cavities of packing, clot, crust, and fibrinopurulent debris. I removed the steroid stent I placed in the frontal maryam-ostium.    Findings  Bloody crust at the edge of her nostrils bilaterally. Expected postsurgical changes of the surgical cavity.   There is mucopurulence around the frontal sinus stent right, which is removed.  This is sample for culture    The patient tolerated the procedure well without complication.     Laboratory Review:  n/a    Imaging Review:  n/a    Pathology Review:  n/a    Assessment/Medical Decision Making:  Sinonasal " osteoma s/p resection 3/1/22  Atypical facial pain    Nasal endoscopy today shows continued healing, but certainly I appreciate some mucopurulence around the neoostium around the stent. I took a culture to evaluate for infection. We will call her with results      Plan:  RTC next week as already scheduled  Continue saline irrigations  Continue deep sea spray  Start Augmentin today, but we did notify Shauna that we may need to switch based on culture results  Taper off of Afrin      Bry Garcia MD    [facility]  Rhinology, Endoscopic Skull Base Surgery  [facility]  Department of Otolaryngology - Head & Neck Surgery    Scribe Disclosure:  I, Michelle Randle, am serving as a scribe to document services personally performed by Bry Garcia MD at this visit, based upon the provider's statements to me. All documentation has been reviewed by the aforementioned provider prior to being entered into the official medical record.     Additional portions of the patient's history have been reviewed below.   ~~~~~~~~~~~~~~~~~~~~~~~~~~~~~~~~~~~~~~~~~~~~~~~~~~~~~~~~~~~~~~~~~~~~~~~~~~~~~~~~~~~~~~~~~~~~~~~~~~~~~~~~~~~~~~~~~~~~~~~~~~~~~~~~~~~~~~~    Past Medical History:   Diagnosis Date     Anxiety      Asthma      Coronary artery disease involving native coronary artery of native heart without angina pectoris      Diabetes mellitus, type 2 (H)      Dyslipidemia      History of coronary angioplasty with insertion of stent      HTN (hypertension)      Obesity         Past Surgical History:   Procedure Laterality Date     ANGIOGRAM  2006    LAD restenosis medical management     CORONARY STENT PLACEMENT  01/01/2001    LAD stenting     GASTRECTOMY LAPAROSCOPIC SLEEVE  07/01/2013     IR MISCELLANEOUS PROCEDURE  11/12/2009     OPTICAL TRACKING SYSTEM ENDOSCOPIC ENDONASAL SURGERY Bilateral 3/1/2022    Procedure: Endoscopic Endonasal Image-Guided Resection of Skull Base Osteoma;  Surgeon:  Bry Garcia MD;  Location: [facility], laparoscopic salpingectomy with hysteroscopic polypectomy and novasure endometrial ablation   03/01/2021        Family History   Problem Relation Age of Onset     Anxiety Disorder Mother      Diabetes Mother      Diabetes Father      Hypertension Father      Diabetes Sister      Hypertension Sister      Cerebrovascular Disease Sister      Hypertension Brother      Hypertension Brother      Heart Failure Brother      Anesthesia Reaction No family hx of      Deep Vein Thrombosis (DVT) No family hx of         Social History     Socioeconomic History     Marital status:      Spouse name: Not on file     Number of children: Not on file     Years of education: Not on file     Highest education level: Not on file   Occupational History     Not on file   Tobacco Use     Smoking status: Light Tobacco Smoker     Smokeless tobacco: Never Used     Tobacco comment: 1-2 puffs 5-7 toimes daily   Substance and Sexual Activity     Alcohol use: Yes     Comment: infrequent     Drug use: Not Currently     Sexual activity: Not on file   Other Topics Concern     Parent/sibling w/ CABG, MI or angioplasty before 65F 55M? Not Asked   Social History Narrative     Not on file     Social Determinants of Health     Financial Resource Strain: Not on file   Food Insecurity: Not on file   Transportation Needs: Not on file   Physical Activity: Not on file   Stress: Not on file   Social Connections: Not on file   Intimate Partner Violence: Not on file   Housing Stability: Not on file

## 2022-03-25 NOTE — PATIENT INSTRUCTIONS
"1. You were seen in the clinic today by Dr. Garcia. If you have any questions or concerns after your appointment, please call the clinic at [phone]. Press \"1\" for scheduling, press \"3\" for nurse advice.    2.   The following has been recommended for you based upon your appointment today:   -Dr. Garcia has taken a culture from the right side of your nose. Someone from our clinic will follow up with you on results once Dr. Garcia has received them.   -In the meantime, a prescription for Augmentin has been sent to your preferred pharmacy. You will take this medication twice daily for the next 10 days.   -Continue doing your sinus rinses twice daily.   -Use Ocean nasal saline spray as needed.   -Stop use of the Afrin nasal spray.    3.   We are keeping your appointment as scheduled next Wednesday, March 30th at 9:45 am. Please reach out to us for questions or concerns that arise after starting the antibiotic.       VIOLA Rincon  [facility]  Department of Otolaryngology  [phone]    "

## 2022-03-25 NOTE — LETTER
3/25/2022       RE: Shauna Mobley  [address] [place] [address]     Dear Colleague,    Thank you for referring your patient, Shauna Mobley, to the [facility] [place] at [facility]. Please see a copy of my visit note below.      [facility]  Return Visit  Encounter date:   March 25, 2022    Chief Complaint: follow up    ID: s/p endonasal Draf 3 approach for resection of osteoma 3/1/22    Interval History:   Shauna Mobley is a 42 year old female, with a history of diabetes and CAD who presents for follow up. I resected a sinonasal mass earlier this month for the patient as noted below. Shauna visited with me 2 weeks ago (03/16/2022) and reported doing her sinus rinses as directed and these have helped her symptoms. Shauna also reported a waxing and waning taste changes and continued anosmia.I placed a steroid stent in the frontal maryam-ostium  She reached out to my team yesterday (03/24/2022) for bloody and brown mucus drainage along with chills (has not checked her temperature), body aches, otalgia, and ear fullness.    Today, the patient presents with her , Tano. They report that she has felt a burning pain sensation on each side of her nose by the corners of her eyes (L > R). She endorses dizziness episodes that she attributes this to her facial pain. Due to this, she has been concerned and not driven her car.    [place] [facility]  Procedure Date: 03/01/2022     PREOPERATIVE DIAGNOSES:    1.  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  Obstructive right frontal sinusitis, chronic.  3.  Atypical facial pain and dizziness.     POSTOPERATIVE DIAGNOSES:    1.  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  Obstructive right frontal sinusitis, chronic.  3.  Atypical facial pain and dizziness.     PROCEDURE PERFORMED:    1.  Endoscopic endonasal resection of extradural anterior cranial fossa tumor,  "likely osteoma.    2.  Computerized image guidance, extradural.    Surgeon(s) and Role:  Bry Garcia MD - Primary  Chieffe, MD Yang - Resident - Assisting    Review of systems: A 14-point review of systems has been conducted and is negative for any notable symptoms, except as dictated in the history of present illness.     Physical Exam:  Vital signs: BP (!) 166/102 (BP Location: Right arm, Patient Position: Sitting, Cuff Size: Adult Regular)   Pulse 72   Temp 97.2  F (36.2  C) (Temporal)   Ht 1.562 m (5' 1.5\")   Wt 78.5 kg (173 lb)   BMI 32.16 kg/m     General Appearance: No acute distress, appropriate demeanor, conversant  Eyes: moist conjunctivae; EOMI; pupils symmetric; visual acuity grossly intact; no proptosis  Head: normocephalic; overall symmetric appearance without deformity  Face: overall symmetric without deformity; HB I/VI   Nose: No external deformity; see endoscopy note  Lungs: symmetric chest rise; no wheezing  CV: Good distal perfusion; normal heart rate  Extremities: No deformity  Neurologic Exam: Cranial nerves II-XII are grossly intact; no focal deficit    Procedure Note  Procedure performed: Rigid nasal endoscopy  Indication: To evaluate for sinonasal pathology not visualized on routine anterior rhinoscopy  Anesthesia: 4% topical lidocaine with 0.05 % oxymetazoline  Description of procedure: A 30 degree, 3 mm rigid endoscope was inserted into bilateral nasal cavities and the nasal valves, nasal cavity, middle meatus, sphenoethmoid recess, nasopharynx were evaluated for evidence of obstruction, edema, purulence, polyps and/or mass/lesion.     I then used a combination of non-cutting forceps, straight and curved suction to clear bilateral surgical cavities of packing, clot, crust, and fibrinopurulent debris. I removed the steroid stent I placed in the frontal maryam-ostium.    Findings  Bloody crust at the edge of her nostrils bilaterally. Expected postsurgical changes of the surgical " cavity.   There is mucopurulence around the frontal sinus stent right, which is removed.  This is sample for culture    The patient tolerated the procedure well without complication.     Laboratory Review:  n/a    Imaging Review:  n/a    Pathology Review:  n/a    Assessment/Medical Decision Making:  Sinonasal osteoma s/p resection 3/1/22  Atypical facial pain    Nasal endoscopy today shows continued healing, but certainly I appreciate some mucopurulence around the neoostium around the stent. I took a culture to evaluate for infection. We will call her with results      Plan:  RTC next week as already scheduled  Continue saline irrigations  Continue deep sea spray  Start Augmentin today, but we did notify Shauna that we may need to switch based on culture results  Taper off of Afrin      Bry Garcia MD    [facility]  Rhinology, Endoscopic Skull Base Surgery  [facility]  Department of Otolaryngology - Head & Neck Surgery    Scribe Disclosure:  I, Michelle Jer, am serving as a scribe to document services personally performed by Bry Garcia MD at this visit, based upon the provider's statements to me. All documentation has been reviewed by the aforementioned provider prior to being entered into the official medical record.     Additional portions of the patient's history have been reviewed below.   ~~~~~~~~~~~~~~~~~~~~~~~~~~~~~~~~~~~~~~~~~~~~~~~~~~~~~~~~~~~~~~~~~~~~~~~~~~~~~~~~~~~~~~~~~~~~~~~~~~~~~~~~~~~~~~~~~~~~~~~~~~~~~~~~~~~~~~~    Past Medical History:   Diagnosis Date     Anxiety      Asthma      Coronary artery disease involving native coronary artery of native heart without angina pectoris      Diabetes mellitus, type 2 (H)      Dyslipidemia      History of coronary angioplasty with insertion of stent      HTN (hypertension)      Obesity         Past Surgical History:   Procedure Laterality Date     ANGIOGRAM  2006    LAD restenosis medical management      CORONARY STENT PLACEMENT  01/01/2001    LAD stenting     GASTRECTOMY LAPAROSCOPIC SLEEVE  07/01/2013     IR MISCELLANEOUS PROCEDURE  11/12/2009     OPTICAL TRACKING SYSTEM ENDOSCOPIC ENDONASAL SURGERY Bilateral 3/1/2022    Procedure: Endoscopic Endonasal Image-Guided Resection of Skull Base Osteoma;  Surgeon: Bry Garcia MD;  Location: [facility], laparoscopic salpingectomy with hysteroscopic polypectomy and novasure endometrial ablation   03/01/2021        Family History   Problem Relation Age of Onset     Anxiety Disorder Mother      Diabetes Mother      Diabetes Father      Hypertension Father      Diabetes Sister      Hypertension Sister      Cerebrovascular Disease Sister      Hypertension Brother      Hypertension Brother      Heart Failure Brother      Anesthesia Reaction No family hx of      Deep Vein Thrombosis (DVT) No family hx of         Social History     Socioeconomic History     Marital status:      Spouse name: Not on file     Number of children: Not on file     Years of education: Not on file     Highest education level: Not on file   Occupational History     Not on file   Tobacco Use     Smoking status: Light Tobacco Smoker     Smokeless tobacco: Never Used     Tobacco comment: 1-2 puffs 5-7 toimes daily   Substance and Sexual Activity     Alcohol use: Yes     Comment: infrequent     Drug use: Not Currently     Sexual activity: Not on file   Other Topics Concern     Parent/sibling w/ CABG, MI or angioplasty before 65F 55M? Not Asked   Social History Narrative     Not on file     Social Determinants of Health     Financial Resource Strain: Not on file   Food Insecurity: Not on file   Transportation Needs: Not on file   Physical Activity: Not on file   Stress: Not on file   Social Connections: Not on file   Intimate Partner Violence: Not on file   Housing Stability: Not on file          Called patient to review results of culture and medication recommendations from  provider.    Left this writer's direct call back number to return my call regarding this.    Will follow up with patient in 24 hours if no callback is received.    KARAN SCHMITT LPN on 3/29/2022 at 8:31 AM      Spoke with patient and reviewed the switch from the Augmentin to the Bactrim. Patient was informed and acknowledge to stop the Augmentin.    Patient scheduled for follow up with provider back in clinic tomorrow. Prescription already sent to pharmacy.    Patient verbalizes understanding of this plan and is in agreement. Patient has no further questions at this time.    KARAN SCHMITT LPN on 3/29/2022 at 2:43 PM        Again, thank you for allowing me to participate in the care of your patient.      Sincerely,    Bry Garcia MD

## 2022-03-28 LAB
BACTERIA SPEC CULT: ABNORMAL
BACTERIA SPEC CULT: ABNORMAL

## 2022-03-28 NOTE — RESULT ENCOUNTER NOTE
Therese,   Can you make sure the micro lab runs sensitivities on klebsiella sp?  We'll need to switch up abx once these return.   Miguel

## 2022-03-29 RX ORDER — SULFAMETHOXAZOLE/TRIMETHOPRIM 800-160 MG
1 TABLET ORAL 2 TIMES DAILY
Qty: 28 TABLET | Refills: 0 | Status: SHIPPED | OUTPATIENT
Start: 2022-03-29 | End: 2022-05-23

## 2022-03-29 NOTE — PROGRESS NOTES
"  [facility]  Return Visit  Encounter date:   March 30, 2022    Chief Complaint:   Follow-up      ID: s/p endonasal Draf 3 approach for resection of osteoma 3/1/22    Interval History:   Shauna Mobley is a 42 year old female with a history of diabetes and CAD who initially presented for sinonasal mass that I resected on 3/1/22 and was confirmed to be osteoma who presents for post-op follow-up. The patient was last seen in clinic on 03/16/2022 and she was doing well at that time.     She does have continued brain-fog and lethargy. She was switched from Augmentin to Bactrim after culture results and has been on this for 2 days now.  Overall, though, she is feeling much better.    Sino-Nasal Outcome Test (SNOT - 22)  [facility] Operative History  Procedure Date: 03/01/2022     PREOPERATIVE DIAGNOSES:    1.  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  Obstructive right frontal sinusitis, chronic.  3.  Atypical facial pain and dizziness.     POSTOPERATIVE DIAGNOSES:    1.  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  Obstructive right frontal sinusitis, chronic.  3.  Atypical facial pain and dizziness.     PROCEDURE PERFORMED:    1.  Endoscopic endonasal resection of extradural anterior cranial fossa tumor, likely osteoma.    2.  Computerized image guidance, extradural.    Review of systems: A 14-point review of systems has been conducted and is negative for any notable symptoms, except as dictated in the history of present illness.     Physical Exam:  Vital signs: BP (!) 137/93 (BP Location: Left arm, Patient Position: Sitting, Cuff Size: Adult Regular)   Pulse 74   Temp 98  F (36.7  C) (Temporal)   Ht 1.562 m (5' 1.5\")   Wt 78.5 kg (173 lb)   SpO2 97%   BMI 32.16 kg/m     General Appearance: No acute distress, appropriate demeanor, conversant  Eyes: moist conjunctivae; EOMI; pupils symmetric; visual acuity grossly intact; no proptosis  Head: normocephalic; overall symmetric appearance " without deformity  Face: overall symmetric without deformity; HB I/VI   Nose: No external deformity; see endoscopy  Lungs: symmetric chest rise; no wheezing  CV: Good distal perfusion; normal hear rate  Extremities: No deformity  Neurologic Exam: Cranial nerves II-XII are grossly intact; no focal deficit      Procedure Note  Procedure performed: Rigid nasal endoscopy  Indication: To evaluate for sinonasal pathology not visualized on routine anterior rhinoscopy  Anesthesia: 4% topical lidocaine with 0.05 % oxymetazoline  Description of procedure: A 30 degree, 3 mm rigid endoscope was inserted into bilateral nasal cavities and the nasal valves, nasal cavity, middle meatus, sphenoethmoid recess, nasopharynx were evaluated for evidence of obstruction, edema, purulence, polyps and/or mass/lesion.     Moy-Raymond Endoscopic Scoring System  Endoscopic observation Right Left   Polyps in middle meatus (0 = absent, 1 = restricted to middle meatus, 2 = Beyond middle meatus) 0 0   Discharge (0 = absent, 1 = thin and clear, 2 = thick, purulent) 0 0   Edema (0 = absent, 1 = mild-moderate, 2 = moderate-severe) 1 1   Crusting (0 = absent, 1 = mild-moderate, 2 = moderate-severe) 0 0   Scarring (0= absent, 1 = mild-moderate, 2 = moderate-severe) 0 0   Total 1 1     Findings  RT: much improved edema of the Daf 3 site; mild residual edema but the maryam-osteum is patent and well appearing  LT: mild crusting suctioned clean; well appearing post surgical cavity    The patient tolerated the procedure well without complication.     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 The previously noted infection and edema of her cavity is much improved. Continue to rinse. Return for follow-up in 2 weeks.     Bry Garcia MD    [facility]  Rhinology, Endoscopic Skull Base Surgery  [facility]  Department of Otolaryngology - Head & Neck Surgery    Scribe Disclosure:  I, August Nery, am serving as a scribe to document services personally performed by Bry Garcia MD at this visit, based upon the provider's statements to me. All documentation has been reviewed by the aforementioned provider prior to being entered into the official medical record.     Additional portions of the patient's history have been reviewed below.   ~~~~~~~~~~~~~~~~~~~~~~~~~~~~~~~~~~~~~~~~~~~~~~~~~~~~~~~~~~~~~~~~~~~~~~~~~~~~~~~~~~~~~~~~~~~~~~~~~~~~~~~~~~~~~~~~~~~~~~~~~~~~~~~~~~~~~~~    Past Medical History:   Diagnosis Date     Anxiety      Asthma      Coronary artery disease involving native coronary artery of native heart without angina pectoris      Diabetes mellitus, type 2 (H)      Dyslipidemia      History of coronary angioplasty with insertion of stent      HTN (hypertension)      Obesity         Past Surgical History:   Procedure Laterality Date     ANGIOGRAM  2006    LAD restenosis medical management     CORONARY STENT PLACEMENT  01/01/2001    LAD stenting     GASTRECTOMY LAPAROSCOPIC SLEEVE  07/01/2013     IR MISCELLANEOUS PROCEDURE  11/12/2009     OPTICAL TRACKING SYSTEM ENDOSCOPIC ENDONASAL SURGERY Bilateral 3/1/2022    Procedure: Endoscopic Endonasal Image-Guided Resection of Skull Base Osteoma;  Surgeon: Bry Garcia MD;  Location:  OR     [place], laparoscopic salpingectomy with hysteroscopic polypectomy and novasure endometrial ablation   03/01/2021        Family History   Problem Relation Age of Onset     Anxiety Disorder Mother      Diabetes Mother      Diabetes Father       Hypertension Father      Diabetes Sister      Hypertension Sister      Cerebrovascular Disease Sister      Hypertension Brother      Hypertension Brother      Heart Failure Brother      Anesthesia Reaction No family hx of      Deep Vein Thrombosis (DVT) No family hx of         Social History     Socioeconomic History     Marital status:      Spouse name: Not on file     Number of children: Not on file     Years of education: Not on file     Highest education level: Not on file   Occupational History     Not on file   Tobacco Use     Smoking status: Light Tobacco Smoker     Smokeless tobacco: Never Used     Tobacco comment: 1-2 puffs 5-7 toimes daily   Substance and Sexual Activity     Alcohol use: Yes     Comment: infrequent     Drug use: Not Currently     Sexual activity: Not on file   Other Topics Concern     Parent/sibling w/ CABG, MI or angioplasty before 65F 55M? Not Asked   Social History Narrative     Not on file     Social Determinants of Health     Financial Resource Strain: Not on file   Food Insecurity: Not on file   Transportation Needs: Not on file   Physical Activity: Not on file   Stress: Not on file   Social Connections: Not on file   Intimate Partner Violence: Not on file   Housing Stability: Not on file

## 2022-03-29 NOTE — PROGRESS NOTES
Called patient to review results of culture and medication recommendations from provider.    Left this writer's direct call back number to return my call regarding this.    Will follow up with patient in 24 hours if no callback is received.    KARAN SCHMITT LPN on 3/29/2022 at 8:31 AM

## 2022-03-29 NOTE — PROGRESS NOTES
Spoke with patient and reviewed the switch from the Augmentin to the Bactrim. Patient was informed and acknowledge to stop the Augmentin.    Patient scheduled for follow up with provider back in clinic tomorrow. Prescription already sent to pharmacy.    Patient verbalizes understanding of this plan and is in agreement. Patient has no further questions at this time.    KARAN SCHMITT, MATEUS on 3/29/2022 at 2:43 PM

## 2022-03-29 NOTE — RESULT ENCOUNTER NOTE
Michelle, can we call Shauna and let her know about her culture results and send bactrim DS BID to take for 2 weeks?    She should stop taking augmentin.     Thanks,  Miguel

## 2022-04-01 ENCOUNTER — OFFICE VISIT (OUTPATIENT)
Dept: OTOLARYNGOLOGY | Facility: CLINIC | Age: 42
End: 2022-04-01
Payer: COMMERCIAL

## 2022-04-01 VITALS
BODY MASS INDEX: 31.83 KG/M2 | OXYGEN SATURATION: 97 % | SYSTOLIC BLOOD PRESSURE: 137 MMHG | HEART RATE: 74 BPM | DIASTOLIC BLOOD PRESSURE: 93 MMHG | HEIGHT: 62 IN | TEMPERATURE: 98 F | WEIGHT: 173 LBS

## 2022-04-01 DIAGNOSIS — J32.1 CHRONIC FRONTAL SINUSITIS: ICD-10-CM

## 2022-04-01 DIAGNOSIS — D16.4 OSTEOMA OF PARANASAL SINUS: Primary | ICD-10-CM

## 2022-04-01 LAB — BACTERIA SPEC CULT: NORMAL

## 2022-04-01 PROCEDURE — 99207 PR CDG-PROCEDURE CHARGE ONLY: CPT | Performed by: OTOLARYNGOLOGY

## 2022-04-01 PROCEDURE — 31231 NASAL ENDOSCOPY DX: CPT | Performed by: OTOLARYNGOLOGY

## 2022-04-01 ASSESSMENT — PAIN SCALES - GENERAL: PAINLEVEL: NO PAIN (0)

## 2022-04-01 NOTE — LETTER
4/1/2022       RE: Shauna Mobley  [address] [place] [address]     Dear Colleague,    Thank you for referring your patient, Shauna Mobley, to the [facility] [place] [facility]. Please see a copy of my visit note below.      [facility]  Return Visit  Encounter date:   March 30, 2022    Chief Complaint:   Follow-up      ID: s/p endonasal Draf 3 approach for resection of osteoma 3/1/22    Interval History:   Shauna Mobley is a 42 year old female with a history of diabetes and CAD who initially presented for sinonasal mass that I resected on 3/1/22 and was confirmed to be osteoma who presents for post-op follow-up. The patient was last seen in clinic on 03/16/2022 and she was doing well at that time.     She does have continued brain-fog and lethargy. She was switched from Augmentin to Bactrim after culture results and has been on this for 2 days now.  Overall, though, she is feeling much better.    Sino-Nasal Outcome Test (SNOT - 22)  [facility]    [facility] Operative History  Procedure Date: 03/01/2022     PREOPERATIVE DIAGNOSES:    1.  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  Obstructive right frontal sinusitis, chronic.  3.  Atypical facial pain and dizziness.     POSTOPERATIVE DIAGNOSES:    1.  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  Obstructive right frontal sinusitis, chronic.  3.  Atypical facial pain and dizziness.     PROCEDURE PERFORMED:    1.  Endoscopic endonasal resection of extradural anterior cranial fossa tumor, likely osteoma.    2.  Computerized image guidance, extradural.    Review of systems: A 14-point review of systems has been conducted and is negative for any notable symptoms, except as dictated in the history of present illness.     Physical Exam:  Vital signs: BP (!) 137/93 (BP Location: Left arm, Patient Position: Sitting, Cuff Size: Adult Regular)   Pulse 74   Temp 98  F (36.7  " C) (Temporal)   Ht 1.562 m (5' 1.5\")   Wt 78.5 kg (173 lb)   SpO2 97%   BMI 32.16 kg/m     General Appearance: No acute distress, appropriate demeanor, conversant  Eyes: moist conjunctivae; EOMI; pupils symmetric; visual acuity grossly intact; no proptosis  Head: normocephalic; overall symmetric appearance without deformity  Face: overall symmetric without deformity; HB I/VI   Nose: No external deformity; see endoscopy  Lungs: symmetric chest rise; no wheezing  CV: Good distal perfusion; normal hear rate  Extremities: No deformity  Neurologic Exam: Cranial nerves II-XII are grossly intact; no focal deficit      Procedure Note  Procedure performed: Rigid nasal endoscopy  Indication: To evaluate for sinonasal pathology not visualized on routine anterior rhinoscopy  Anesthesia: 4% topical lidocaine with 0.05 % oxymetazoline  Description of procedure: A 30 degree, 3 mm rigid endoscope was inserted into bilateral nasal cavities and the nasal valves, nasal cavity, middle meatus, sphenoethmoid recess, nasopharynx were evaluated for evidence of obstruction, edema, purulence, polyps and/or mass/lesion.     Moy-Raymond Endoscopic Scoring System  Endoscopic observation Right Left   Polyps in middle meatus (0 = absent, 1 = restricted to middle meatus, 2 = Beyond middle meatus) 0 0   Discharge (0 = absent, 1 = thin and clear, 2 = thick, purulent) 0 0   Edema (0 = absent, 1 = mild-moderate, 2 = moderate-severe) 1 1   Crusting (0 = absent, 1 = mild-moderate, 2 = moderate-severe) 0 0   Scarring (0= absent, 1 = mild-moderate, 2 = moderate-severe) 0 0   Total 1 1     Findings  RT: much improved edema of the Daf 3 site; mild residual edema but the maryam-osteum is patent and well appearing  LT: mild crusting suctioned clean; well appearing post surgical cavity    The patient tolerated the procedure well without complication.     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 The previously noted infection and edema of her cavity is much improved. Continue to rinse. Return for follow-up in 2 weeks.     Bry Garcia MD    [facility]  Rhinology, Endoscopic Skull Base Surgery  [facility]  Department of Otolaryngology - Head & Neck Surgery    Scribe Disclosure:  I, Rayshawn Gabriel, am serving as a scribe to document services personally performed by Bry Garcia MD at this visit, based upon the provider's statements to me. All documentation has been reviewed by the aforementioned provider prior to being entered into the official medical record.     Additional portions of the patient's history have been reviewed below.   ~~~~~~~~~~~~~~~~~~~~~~~~~~~~~~~~~~~~~~~~~~~~~~~~~~~~~~~~~~~~~~~~~~~~~~~~~~~~~~~~~~~~~~~~~~~~~~~~~~~~~~~~~~~~~~~~~~~~~~~~~~~~~~~~~~~~~~~    Past Medical History:   Diagnosis Date     Anxiety      Asthma      Coronary artery disease involving native coronary artery of native heart without angina pectoris      Diabetes mellitus, type 2 (H)      Dyslipidemia      History of coronary angioplasty with insertion of stent      HTN (hypertension)      Obesity         Past Surgical History:   Procedure Laterality Date     ANGIOGRAM  2006    LAD restenosis medical management     CORONARY STENT PLACEMENT  01/01/2001    LAD stenting     GASTRECTOMY LAPAROSCOPIC SLEEVE  07/01/2013     IR MISCELLANEOUS PROCEDURE  11/12/2009     OPTICAL TRACKING SYSTEM ENDOSCOPIC ENDONASAL SURGERY Bilateral 3/1/2022    Procedure: Endoscopic Endonasal Image-Guided  Resection of Skull Base Osteoma;  Surgeon: Bry Garcia MD;  Location: [facility] OR     [place], laparoscopic salpingectomy with hysteroscopic polypectomy and novasure endometrial ablation   03/01/2021        Family History   Problem Relation Age of Onset     Anxiety Disorder Mother      Diabetes Mother      Diabetes Father      Hypertension Father      Diabetes Sister      Hypertension Sister      Cerebrovascular Disease Sister      Hypertension Brother      Hypertension Brother      Heart Failure Brother      Anesthesia Reaction No family hx of      Deep Vein Thrombosis (DVT) No family hx of         Social History     Socioeconomic History     Marital status:      Spouse name: Not on file     Number of children: Not on file     Years of education: Not on file     Highest education level: Not on file   Occupational History     Not on file   Tobacco Use     Smoking status: Light Tobacco Smoker     Smokeless tobacco: Never Used     Tobacco comment: 1-2 puffs 5-7 toimes daily   Substance and Sexual Activity     Alcohol use: Yes     Comment: infrequent     Drug use: Not Currently     Sexual activity: Not on file   Other Topics Concern     Parent/sibling w/ CABG, MI or angioplasty before 65F 55M? Not Asked   Social History Narrative     Not on file     Social Determinants of Health     Financial Resource Strain: Not on file   Food Insecurity: Not on file   Transportation Needs: Not on file   Physical Activity: Not on file   Stress: Not on file   Social Connections: Not on file   Intimate Partner Violence: Not on file   Housing Stability: Not on file           Again, thank you for allowing me to participate in the care of your patient.      Sincerely,    Bry Garcia MD

## 2022-04-01 NOTE — NURSING NOTE
"Chief Complaint   Patient presents with     RECHECK     2 week follow up, surgery 3/1    Blood pressure (!) 137/93, pulse 74, temperature 98  F (36.7  C), temperature source Temporal, height 1.562 m (5' 1.5\"), weight 78.5 kg (173 lb), SpO2 97 %, not currently breastfeeding. Uma Cruz, EMT  "

## 2022-04-01 NOTE — PATIENT INSTRUCTIONS
"1. You were seen in the clinic today by Dr. Garcia. If you have any questions or concerns after your appointment, please call the clinic at [phone]. Press \"1\" for scheduling, press \"3\" for nurse advice.    2.   The following has been recommended for you based upon your appointment today:   -Sinus rinses twice daily.    3.   Plan to return the clinic in 2 weeks. We will call you to set this up.       VIOLA Rincon  [facility]  Department of Otolaryngology  [phone]    "

## 2022-04-04 ENCOUNTER — TELEPHONE (OUTPATIENT)
Dept: OTOLARYNGOLOGY | Facility: CLINIC | Age: 42
End: 2022-04-04
Payer: COMMERCIAL

## 2022-04-04 NOTE — TELEPHONE ENCOUNTER
Called patient to schedule follow up with provider on April 15th. Unable to schedule when patient was in clinic due to provider's schedule not being open for scheduling.    Offered multiple times to patient. Patient elected to take the 11:00 am slot on the schedule for April 15th.    Patient confirmed date and time of appointment. Patient was advised to contact us should she have any questions or concerns prior to her follow up appointment.    Patient verbalizes understanding of this plan and is in agreement. Patient has no further questions at this time.    KARAN SCHMITT LPN on 4/4/2022 at 1:03 PM

## 2022-04-14 NOTE — PATIENT INSTRUCTIONS
"You were seen in the clinic today by Dr. Garcia. If you have any questions or concerns after your appointment, please call the clinic at [phone]. Press \"1\" for scheduling, press \"3\" for nurse advice.    2.   The following has been recommended for you based upon your appointment today:   -sinuses look great!   -keep up with sinus rinses two times daily (more if needed)    3.   Plan to return the clinic in 1 month       VIOLA Rincon  [facility]  Department of Otolaryngology  [phone]   "

## 2022-04-15 ENCOUNTER — OFFICE VISIT (OUTPATIENT)
Dept: OTOLARYNGOLOGY | Facility: CLINIC | Age: 42
End: 2022-04-15
Payer: COMMERCIAL

## 2022-04-15 ENCOUNTER — DOCUMENTATION ONLY (OUTPATIENT)
Dept: OTOLARYNGOLOGY | Facility: CLINIC | Age: 42
End: 2022-04-15
Payer: COMMERCIAL

## 2022-04-15 VITALS
OXYGEN SATURATION: 100 % | HEART RATE: 72 BPM | BODY MASS INDEX: 31.65 KG/M2 | WEIGHT: 172 LBS | DIASTOLIC BLOOD PRESSURE: 88 MMHG | TEMPERATURE: 97.5 F | SYSTOLIC BLOOD PRESSURE: 133 MMHG | HEIGHT: 62 IN

## 2022-04-15 DIAGNOSIS — D16.4 OSTEOMA OF PARANASAL SINUS: ICD-10-CM

## 2022-04-15 DIAGNOSIS — J32.1 CHRONIC FRONTAL SINUSITIS: Primary | ICD-10-CM

## 2022-04-15 PROCEDURE — 99207 PR CDG-PROCEDURE CHARGE ONLY: CPT | Performed by: OTOLARYNGOLOGY

## 2022-04-15 PROCEDURE — 31231 NASAL ENDOSCOPY DX: CPT | Mod: 79 | Performed by: OTOLARYNGOLOGY

## 2022-04-15 ASSESSMENT — PAIN SCALES - GENERAL: PAINLEVEL: NO PAIN (0)

## 2022-04-15 NOTE — LETTER
4/15/2022       RE: Shauna Mobley  [address] [place] [address]     Dear Colleague,    Thank you for referring your patient, Shauna Mobley, to the [facility] [place] at [facility]. Please see a copy of my visit note below.      [facility]  Return Visit  Encounter date:   April 15, 2022    Chief Complaint:   Follow-up    ID: s/p endonasal Draf 3 approach for resection of osteoma 3/1/22    Interval History:   Shauna Mobley is a 42 year old female, with history of diabetes and CAD who initially presented to my care for a sinonasal mass - confirmed osteoma that I resected last month (03/01/2022) who presents for follow up. I initially placed her on Augmentin after surgery, but switched her to Bactrim after her cultures returned.  The patient reported doing well at her first postoperative visit. She reached out to us for some ongoing brain-fog and lethargy.     Today, the patient reports significant improvement since completing antibiotics  Energy levels improving  Min nasal discharge      [facility] [facility]  Procedure Date: 03/01/2022     PREOPERATIVE DIAGNOSES:    1.  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  Obstructive right frontal sinusitis, chronic.  3.  Atypical facial pain and dizziness.     POSTOPERATIVE DIAGNOSES:    1.  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  Obstructive right frontal sinusitis, chronic.  3.  Atypical facial pain and dizziness.     PROCEDURE PERFORMED:    1.  Endoscopic endonasal resection of extradural anterior cranial fossa tumor, likely osteoma.    2.  Computerized image guidance, extradural.    Review of systems: A 14-point review of systems has been conducted and is negative for any notable symptoms, except as dictated in the history of present illness.     Physical Exam:  Vital signs: There were no vitals taken for this visit.   General Appearance: No acute  distress, appropriate demeanor, conversant  Eyes: moist conjunctivae; EOMI; pupils symmetric; visual acuity grossly intact; no proptosis  Head: normocephalic; overall symmetric appearance without deformity  Face: overall symmetric without deformity; HB I/VI  Ears: Normal appearance of external ear; external meatus normal in appearance;   Nose: No external deformity; see nasal endoscopy      Procedure Note  Procedure performed: Rigid nasal endoscopy  Indication: To evaluate for sinonasal pathology not visualized on routine anterior rhinoscopy  Anesthesia: 4% topical lidocaine with 0.05 % oxymetazoline  Description of procedure: A 30 degree, 3 mm rigid endoscope was inserted into bilateral nasal cavities and the nasal valves, nasal cavity, middle meatus, sphenoethmoid recess, nasopharynx were evaluated for evidence of obstruction, edema, purulence, polyps and/or mass/lesion.     [place] Endoscopic Scoring System  Endoscopic observation Right Left   Polyps in middle meatus (0 = absent, 1 = restricted to middle meatus, 2 = Beyond middle meatus) 0 0   Discharge (0 = absent, 1 = thin and clear, 2 = thick, purulent) 0 0   Edema (0 = absent, 1 = mild-moderate, 2 = moderate-severe) 1 1   Crusting (0 = absent, 1 = mild-moderate, 2 = moderate-severe) 1 1   Scarring (0= absent, 1 = mild-moderate, 2 = moderate-severe) 0 0   Total 2 2     Findings  RT: ethmoid edema clearing; there is some crusting and mucopus around maryam-ostium  LT: crusting around naso-frontal beak - not ready to debride    The patient tolerated the procedure well without complication.     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         Plan:  Cont saline rinse BID  RTC in 1 month, or sooner prn      Bry Garcia MD    [facility]  Rhinology, Endoscopic Skull Base Surgery  [facility]  Department of Otolaryngology - Head & Neck Surgery    Scribe Disclosure:  I, Michelle Randle, prepped the chart for today's encounter    Additional portions of the patient's history have been reviewed below.   ~~~~~~~~~~~~~~~~~~~~~~~~~~~~~~~~~~~~~~~~~~~~~~~~~~~~~~~~~~~~~~~~~~~~~~~~~~~~~~~~~~~~~~~~~~~~~~~~~~~~~~~~~~~~~~~~~~~~~~~~~~~~~~~~~~~~~~~    Past Medical History:   Diagnosis Date     Anxiety      Asthma      Coronary artery disease involving native coronary artery of native heart without angina pectoris      Diabetes mellitus, type 2 (H)      Dyslipidemia      History of coronary angioplasty with insertion of stent      HTN (hypertension)      Obesity         Past Surgical History:   Procedure Laterality Date     ANGIOGRAM  2006    LAD restenosis medical management     CORONARY STENT PLACEMENT  01/01/2001    LAD stenting     GASTRECTOMY LAPAROSCOPIC SLEEVE  07/01/2013     IR MISCELLANEOUS PROCEDURE  11/12/2009     OPTICAL TRACKING SYSTEM ENDOSCOPIC ENDONASAL SURGERY Bilateral 3/1/2022    Procedure: Endoscopic Endonasal Image-Guided Resection of Skull Base Osteoma;  Surgeon: Bry Garcia MD;  Location:  OR     [place], laparoscopic salpingectomy with hysteroscopic polypectomy and novasure endometrial ablation   03/01/2021        Family History   Problem Relation Age of Onset     Anxiety Disorder Mother      Diabetes Mother      Diabetes Father      Hypertension Father      Diabetes Sister      Hypertension Sister      Cerebrovascular Disease Sister      Hypertension Brother      Hypertension Brother      Heart Failure Brother      Anesthesia  Reaction No family hx of      Deep Vein Thrombosis (DVT) No family hx of         Social History     Socioeconomic History     Marital status:    Tobacco Use     Smoking status: Light Tobacco Smoker     Smokeless tobacco: Never Used     Tobacco comment: 1-2 puffs 5-7 toimes daily   Substance and Sexual Activity     Alcohol use: Yes     Comment: infrequent     Drug use: Not Currently          Again, thank you for allowing me to participate in the care of your patient.      Sincerely,    Bry Garcia MD

## 2022-04-15 NOTE — PROGRESS NOTES
Per patient request, mailed FMLA paperwork to home address due to patient not receiving this paperwork before she left from her clinic appointment today. Paperwork previously sent to scan as well as faxed to patient's employer.    Patient verbalizes understanding of this plan and is in agreement. Patient has no further questions at this time.    KARAN SCHMITT LPN on 4/15/2022 at 11:36 AM

## 2022-04-15 NOTE — NURSING NOTE
"Chief Complaint   Patient presents with     RECHECK     Post op follow up, surgery 3/01       Blood pressure 133/88, pulse 72, temperature 97.5  F (36.4  C), temperature source Temporal, height 1.562 m (5' 1.5\"), weight 78 kg (172 lb), SpO2 100 %, not currently breastfeeding.    Neela Rajput, EMT    "

## 2022-04-15 NOTE — PROGRESS NOTES
[facility]  Return Visit  Encounter date:   April 15, 2022    Chief Complaint:   Follow-up    ID: s/p endonasal Draf 3 approach for resection of osteoma 3/1/22    Interval History:   Shauna Mobley is a 42 year old female, with history of diabetes and CAD who initially presented to my care for a sinonasal mass - confirmed osteoma that I resected last month (03/01/2022) who presents for follow up. I initially placed her on Augmentin after surgery, but switched her to Bactrim after her cultures returned.  The patient reported doing well at her first postoperative visit. She reached out to us for some ongoing brain-fog and lethargy.     Today, the patient reports significant improvement since completing antibiotics  Energy levels improving  Min nasal discharge      [place] Oper[facility]  Procedure Date: 03/01/2022     PREOPERATIVE DIAGNOSES:    1.  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  Obstructive right frontal sinusitis, chronic.  3.  Atypical facial pain and dizziness.     POSTOPERATIVE DIAGNOSES:    1.  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  Obstructive right frontal sinusitis, chronic.  3.  Atypical facial pain and dizziness.     PROCEDURE PERFORMED:    1.  Endoscopic endonasal resection of extradural anterior cranial fossa tumor, likely osteoma.    2.  Computerized image guidance, extradural.    Review of systems: A 14-point review of systems has been conducted and is negative for any notable symptoms, except as dictated in the history of present illness.     Physical Exam:  Vital signs: There were no vitals taken for this visit.   General Appearance: No acute distress, appropriate demeanor, conversant  Eyes: moist conjunctivae; EOMI; pupils symmetric; visual acuity grossly intact; no proptosis  Head: normocephalic; overall symmetric appearance without deformity  Face: overall symmetric without deformity; HB I/VI  Ears: Normal appearance of external ear; external meatus  normal in appearance;   Nose: No external deformity; see nasal endoscopy      Procedure Note  Procedure performed: Rigid nasal endoscopy  Indication: To evaluate for sinonasal pathology not visualized on routine anterior rhinoscopy  Anesthesia: 4% topical lidocaine with 0.05 % oxymetazoline  Description of procedure: A 30 degree, 3 mm rigid endoscope was inserted into bilateral nasal cavities and the nasal valves, nasal cavity, middle meatus, sphenoethmoid recess, nasopharynx were evaluated for evidence of obstruction, edema, purulence, polyps and/or mass/lesion.     [place]-Ray[place] Endoscopic Scoring System  Endoscopic observation Right Left   Polyps in middle meatus (0 = absent, 1 = restricted to middle meatus, 2 = Beyond middle meatus) 0 0   Discharge (0 = absent, 1 = thin and clear, 2 = thick, purulent) 0 0   Edema (0 = absent, 1 = mild-moderate, 2 = moderate-severe) 1 1   Crusting (0 = absent, 1 = mild-moderate, 2 = moderate-severe) 1 1   Scarring (0= absent, 1 = mild-moderate, 2 = moderate-severe) 0 0   Total 2 2     Findings  RT: ethmoid edema clearing; there is some crusting and mucopus around maryam-ostium  LT: crusting around naso-frontal beak - not ready to debride    The patient tolerated the procedure well without complication.     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         Plan:  Cont saline rinse BID  RTC in 1 month, or sooner prn      Bry Garcia MD    [facility]  Rhinology,  Endoscopic Skull Base Surgery  [facility]  Department of Otolaryngology - Head & Neck Surgery    Scribe Disclosure:  I, Michelle Randle, prepped the chart for today's encounter    Additional portions of the patient's history have been reviewed below.   ~~~~~~~~~~~~~~~~~~~~~~~~~~~~~~~~~~~~~~~~~~~~~~~~~~~~~~~~~~~~~~~~~~~~~~~~~~~~~~~~~~~~~~~~~~~~~~~~~~~~~~~~~~~~~~~~~~~~~~~~~~~~~~~~~~~~~~~    Past Medical History:   Diagnosis Date     Anxiety      Asthma      Coronary artery disease involving native coronary artery of native heart without angina pectoris      Diabetes mellitus, type 2 (H)      Dyslipidemia      History of coronary angioplasty with insertion of stent      HTN (hypertension)      Obesity         Past Surgical History:   Procedure Laterality Date     ANGIOGRAM  2006    LAD restenosis medical management     CORONARY STENT PLACEMENT  01/01/2001    LAD stenting     GASTRECTOMY LAPAROSCOPIC SLEEVE  07/01/2013     IR MISCELLANEOUS PROCEDURE  11/12/2009     OPTICAL TRACKING SYSTEM ENDOSCOPIC ENDONASAL SURGERY Bilateral 3/1/2022    Procedure: Endoscopic Endonasal Image-Guided Resection of Skull Base Osteoma;  Surgeon: Bry Garcia MD;  Location: [facility], laparoscopic salpingectomy with hysteroscopic polypectomy and novasure endometrial ablation   03/01/2021        Family History   Problem Relation Age of Onset     Anxiety Disorder Mother      Diabetes Mother      Diabetes Father      Hypertension Father      Diabetes Sister      Hypertension Sister      Cerebrovascular Disease Sister      Hypertension Brother      Hypertension Brother      Heart Failure Brother      Anesthesia Reaction No family hx of      Deep Vein Thrombosis (DVT) No family hx of         Social History     Socioeconomic History     Marital status:    Tobacco Use     Smoking status: Light Tobacco Smoker     Smokeless tobacco: Never Used     Tobacco comment: 1-2 puffs 5-7 toimes daily   Substance and Sexual  Activity     Alcohol use: Yes     Comment: infrequent     Drug use: Not Currently

## 2022-05-18 ENCOUNTER — OFFICE VISIT (OUTPATIENT)
Dept: OTOLARYNGOLOGY | Facility: CLINIC | Age: 42
End: 2022-05-18
Payer: COMMERCIAL

## 2022-05-18 VITALS
TEMPERATURE: 98.7 F | HEIGHT: 62 IN | HEART RATE: 73 BPM | BODY MASS INDEX: 31.65 KG/M2 | WEIGHT: 172 LBS | SYSTOLIC BLOOD PRESSURE: 136 MMHG | OXYGEN SATURATION: 100 % | DIASTOLIC BLOOD PRESSURE: 84 MMHG

## 2022-05-18 DIAGNOSIS — J32.1 CHRONIC FRONTAL SINUSITIS: Primary | ICD-10-CM

## 2022-05-18 PROCEDURE — 87076 CULTURE ANAEROBE IDENT EACH: CPT | Mod: 90 | Performed by: PATHOLOGY

## 2022-05-18 PROCEDURE — 31231 NASAL ENDOSCOPY DX: CPT | Performed by: OTOLARYNGOLOGY

## 2022-05-18 PROCEDURE — 87077 CULTURE AEROBIC IDENTIFY: CPT | Mod: 90 | Performed by: PATHOLOGY

## 2022-05-18 PROCEDURE — 99000 SPECIMEN HANDLING OFFICE-LAB: CPT | Performed by: PATHOLOGY

## 2022-05-18 PROCEDURE — 87075 CULTR BACTERIA EXCEPT BLOOD: CPT | Mod: 90 | Performed by: PATHOLOGY

## 2022-05-18 PROCEDURE — 87070 CULTURE OTHR SPECIMN AEROBIC: CPT | Mod: 90 | Performed by: PATHOLOGY

## 2022-05-18 PROCEDURE — 87186 SC STD MICRODIL/AGAR DIL: CPT | Mod: 90 | Performed by: PATHOLOGY

## 2022-05-18 PROCEDURE — 99212 OFFICE O/P EST SF 10 MIN: CPT | Mod: 25 | Performed by: OTOLARYNGOLOGY

## 2022-05-18 ASSESSMENT — PAIN SCALES - GENERAL: PAINLEVEL: NO PAIN (0)

## 2022-05-18 NOTE — LETTER
5/18/2022       RE: Shauna Mobley  [address] [place] [address]     Dear Colleague,    Thank you for referring your patient, Shauna Mobley, to the [facility] E[facility]SE AND THROAT CLINIC [place] at [facility]. Please see a copy of my visit note below.      [facility]  Return Visit  Encounter date:   May 18, 2022    Chief Complaint:   Follow-up     ID: s/p endonasal Draf 3 approach for resection of osteoma 3/1/22    Interval History:   Shauna Mobley is a 42 year old female, with history of diabetes and CAD who presents for follow up. Shauna initially presented to my care of sinonasal mass - confirmed osteoma that I resected 2 months ago (03/01/2022). Shauna noted some brain-fog and lethargy after surgery. At her last visit one month ago (04/15/2022), she reported improvement in her symptoms since finishing her antibiotic course, along with improvement in her energy levels.    Today, the patient presents with her , Tano. They report that she is having  ongoing dizziness. Shauna describes that her dizziness is overall improved from what is used to be, but is still present. Shauna endorses ongoing brain-fog and feeling disoriented that is affecting her daily life. 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 She feels that allergies, increased stress, and lack of sleep could be contributing to her symptoms. Shauna is unsure how her sugars have been lately, as she has not been checking them regularly.    Sino-Nasal Outcome Test (SNOT - 22)  1. Need to Blow Nose: (P) Mild or slight  2. Nasal Blockage: (P) None  3. Sneezing: (P) None  4. Runny Nose: (P) None  5. Cough: (P) Mild or slight  6. Post-nasal discharge: (P) Mild or slight  7. Thick nasal discharge: (P) None  8. Ear fullness: (P) None  9. Dizziness: (P) Mild or slight  10. Ear Pain: (P) None  11. Facial  "pain/pressure: (P) Mild or slight  12. Decreased Sense of Smell/Taste: (P) Mild or slight  13. Difficulty falling asleep: (P) Mild or slight  14. Wake up at night: (P) Mild or slight  15. Lack of a good night's sleep: (P) Moderate  16. Wake up tired: (P) Moderate  17. Fatigue: (P) Mild or slight  18. Reduced Productivity: (P) Mild or slight  19. Reduced Concentration: (P) Very mild  20. Frustrated/restless/irritable: (P) Mild or slight  21. Sad: (P) Mild or slight  22. Embarrassed: (P) Mild or slight  Total Score: (P) 33    [place] [facility]  Procedure Date: 03/01/2022     PREOPERATIVE DIAGNOSES:    1.  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  Obstructive right frontal sinusitis, chronic.  3.  Atypical facial pain and dizziness.     POSTOPERATIVE DIAGNOSES:    1.  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  Obstructive right frontal sinusitis, chronic.  3.  Atypical facial pain and dizziness.     PROCEDURE PERFORMED:    1.  Endoscopic endonasal resection of extradural anterior cranial fossa tumor, likely osteoma.    2.  Computerized image guidance, extradural.    Review of systems: A 14-point review of systems has been conducted and is negative for any notable symptoms, except as dictated in the history of present illness.     Physical Exam:  Vital signs: /84 (BP Location: Left arm, Patient Position: Sitting, Cuff Size: Adult Regular)   Pulse 73   Temp 98.7  F (37.1  C) (Temporal)   Ht 1.562 m (5' 1.5\")   Wt 78 kg (172 lb)   SpO2 100%   BMI 31.97 kg/m     General Appearance: No acute distress, appropriate demeanor, conversant  Eyes: moist conjunctivae; EOMI; pupils symmetric; visual acuity grossly intact; no proptosis  Head: normocephalic; overall symmetric appearance without deformity  Face: overall symmetric without deformity; HB I/VI  Nose: No external deformity; see endoscopy exam below   Lungs: symmetric chest rise; no wheezing  CV: Good distal perfusion; normal hear " rate  Extremities: No deformity  Neurologic Exam: Cranial nerves II-XII are grossly intact; no focal deficit      Procedure Note  Procedure performed: Rigid nasal endoscopy  Indication: To evaluate for sinonasal pathology not visualized on routine anterior rhinoscopy  Anesthesia: 4% topical lidocaine with 0.05 % oxymetazoline  Description of procedure: A 30 degree, 3 mm rigid endoscope was inserted into bilateral nasal cavities and the nasal valves, nasal cavity, middle meatus, sphenoethmoid recess, nasopharynx were evaluated for evidence of obstruction, edema, purulence, polyps and/or mass/lesion.     [place]-Raymond Endoscopic Scoring System  Endoscopic observation Right Left   Polyps in middle meatus (0 = absent, 1 = restricted to middle meatus, 2 = Beyond middle meatus) 0 0   Discharge (0 = absent, 1 = thin and clear, 2 = thick, purulent) 1 1   Edema (0 = absent, 1 = mild-moderate, 2 = moderate-severe) 1 1   Crusting (0 = absent, 1 = mild-moderate, 2 = moderate-severe) 0 0   Scarring (0= absent, 1 = mild-moderate, 2 = moderate-severe) 0 0   Total 2 2     Findings  RT: ethmoid edema  LT: edema and drainage of the MM - I took a culture of the drainage from max    The patient tolerated the procedure well without complication.     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 No evidence of active sinusitis today.    I recommended checking with her PCP for updated A1C level and ensuring that her diabetes is under control. We discussed that her psychosocial stressors and sugar levels may both be contributing to her symptoms of dizziness and  brain fog.       Plan:  Cont nasal regimen  F/u cultures  Check sugars with PCP  RTC in 6 weeks        Bry Garcia MD    [facility]  Rhinology, Endoscopic Skull Base Surgery  [facility]  Department of Otolaryngology - Head & Neck Surgery    Scribe Disclosure:  I, Michelle Randle, am serving as a scribe to document services personally performed by Bry Garcia MD at this visit, based upon the provider's statements to me. All documentation has been reviewed by the aforementioned provider prior to being entered into the official medical record.     Additional portions of the patient's history have been reviewed below.   ~~~~~~~~~~~~~~~~~~~~~~~~~~~~~~~~~~~~~~~~~~~~~~~~~~~~~~~~~~~~~~~~~~~~~~~~~~~~~~~~~~~~~~~~~~~~~~~~~~~~~~~~~~~~~~~~~~~~~~~~~~~~~~~~~~~~~~~    Past Medical History:   Diagnosis Date     Anxiety      Asthma      Coronary artery disease involving native coronary artery of native heart without angina pectoris      Diabetes mellitus, type 2 (H)      Dyslipidemia      History of coronary angioplasty with insertion of stent      HTN (hypertension)      Obesity         Past Surgical History:   Procedure Laterality Date     ANGIOGRAM  2006    LAD restenosis medical management     CORONARY STENT PLACEMENT  01/01/2001    LAD stenting     GASTRECTOMY LAPAROSCOPIC SLEEVE  07/01/2013     IR MISCELLANEOUS PROCEDURE  11/12/2009     OPTICAL TRACKING SYSTEM ENDOSCOPIC ENDONASAL SURGERY Bilateral 3/1/2022    Procedure: Endoscopic Endonasal Image-Guided Resection of Skull Base Osteoma;  Surgeon: Bry Garcia MD;  Location:  OR     [place], laparoscopic salpingectomy with hysteroscopic polypectomy and novasure endometrial ablation   03/01/2021        Family History   Problem Relation Age of Onset     Anxiety Disorder Mother      Diabetes Mother      Diabetes Father      Hypertension Father      Diabetes Sister      Hypertension Sister      Cerebrovascular Disease  Sister      Hypertension Brother      Hypertension Brother      Heart Failure Brother      Anesthesia Reaction No family hx of      Deep Vein Thrombosis (DVT) No family hx of         Social History     Socioeconomic History     Marital status:    Tobacco Use     Smoking status: Light Tobacco Smoker     Smokeless tobacco: Never Used     Tobacco comment: 1-2 puffs 5-7 toimes daily   Substance and Sexual Activity     Alcohol use: Yes     Comment: infrequent     Drug use: Not Currently        Again, thank you for allowing me to participate in the care of your patient.      Sincerely,    Bry Garcia MD

## 2022-05-18 NOTE — PROGRESS NOTES
[facility]  Return Visit  Encounter date:   May 18, 2022    Chief Complaint:   Follow-up     ID: s/p endonasal Draf 3 approach for resection of osteoma 3/1/22    Interval History:   Shauna Mobley is a 42 year old female, with history of diabetes and CAD who presents for follow up. Shauna initially presented to my care of sinonasal mass - confirmed osteoma that I resected 2 months ago (03/01/2022). Shauna noted some brain-fog and lethargy after surgery. At her last visit one month ago (04/15/2022), she reported improvement in her symptoms since finishing her antibiotic course, along with improvement in her energy levels.    Today, the patient presents with her , Tano. They report that she is having  ongoing dizziness. Shauna describes that her dizziness is overall improved from what is used to be, but is still present. Shauna endorses ongoing brain-fog and feeling disoriented that is affecting her daily life. 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 She feels that allergies, increased stress, and lack of sleep could be contributing to her symptoms. Shauna is unsure how her sugars have been lately, as she has not been checking them regularly.    Sino-Nasal Outcome Test (SNOT - 22)  1. Need to Blow Nose: (P) Mild or slight  2. Nasal Blockage: (P) None  3. Sneezing: (P) None  4. Runny Nose: (P) None  5. Cough: (P) Mild or slight  6. Post-nasal discharge: (P) Mild or slight  7. Thick nasal discharge: (P) None  8. Ear fullness: (P) None  9. Dizziness: (P) Mild or slight  10. Ear Pain: (P) None  11. Facial pain/pressure: (P) Mild or slight  12. Decreased Sense of Smell/Taste: (P) Mild or slight  13. Difficulty falling asleep: (P) Mild or slight  14. Wake up at night: (P) Mild or slight  15. Lack of a good night's sleep: (P) Moderate  16. Wake up tired: (P) Moderate  17. Fatigue: (P) Mild or slight  18. Reduced Productivity: (P)  "Mild or slight  19. Reduced Concentration: (P) Very mild  20. Frustrated/restless/irritable: (P) Mild or slight  21. Sad: (P) Mild or slight  22. Embarrassed: (P) Mild or slight  Total Score: (P) 33    [place] [facility]  Procedure Date: 03/01/2022     PREOPERATIVE DIAGNOSES:    1.  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  Obstructive right frontal sinusitis, chronic.  3.  Atypical facial pain and dizziness.     POSTOPERATIVE DIAGNOSES:    1.  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  Obstructive right frontal sinusitis, chronic.  3.  Atypical facial pain and dizziness.     PROCEDURE PERFORMED:    1.  Endoscopic endonasal resection of extradural anterior cranial fossa tumor, likely osteoma.    2.  Computerized image guidance, extradural.    Review of systems: A 14-point review of systems has been conducted and is negative for any notable symptoms, except as dictated in the history of present illness.     Physical Exam:  Vital signs: /84 (BP Location: Left arm, Patient Position: Sitting, Cuff Size: Adult Regular)   Pulse 73   Temp 98.7  F (37.1  C) (Temporal)   Ht 1.562 m (5' 1.5\")   Wt 78 kg (172 lb)   SpO2 100%   BMI 31.97 kg/m     General Appearance: No acute distress, appropriate demeanor, conversant  Eyes: moist conjunctivae; EOMI; pupils symmetric; visual acuity grossly intact; no proptosis  Head: normocephalic; overall symmetric appearance without deformity  Face: overall symmetric without deformity; HB I/VI  Nose: No external deformity; see endoscopy exam below   Lungs: symmetric chest rise; no wheezing  CV: Good distal perfusion; normal hear rate  Extremities: No deformity  Neurologic Exam: Cranial nerves II-XII are grossly intact; no focal deficit      Procedure Note  Procedure performed: Rigid nasal endoscopy  Indication: To evaluate for sinonasal pathology not visualized on routine anterior rhinoscopy  Anesthesia: 4% topical lidocaine with 0.05 % " oxymetazoline  Description of procedure: A 30 degree, 3 mm rigid endoscope was inserted into bilateral nasal cavities and the nasal valves, nasal cavity, middle meatus, sphenoethmoid recess, nasopharynx were evaluated for evidence of obstruction, edema, purulence, polyps and/or mass/lesion.     Jasper-Raymond Endoscopic Scoring System  Endoscopic observation Right Left   Polyps in middle meatus (0 = absent, 1 = restricted to middle meatus, 2 = Beyond middle meatus) 0 0   Discharge (0 = absent, 1 = thin and clear, 2 = thick, purulent) 1 1   Edema (0 = absent, 1 = mild-moderate, 2 = moderate-severe) 1 1   Crusting (0 = absent, 1 = mild-moderate, 2 = moderate-severe) 0 0   Scarring (0= absent, 1 = mild-moderate, 2 = moderate-severe) 0 0   Total 2 2     Findings  RT: ethmoid edema  LT: edema and drainage of the MM - I took a culture of the drainage from max    The patient tolerated the procedure well without complication.     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 No evidence of active sinusitis today.    I recommended checking with her PCP for updated A1C level and ensuring that her diabetes is under control. We discussed that her psychosocial stressors and sugar levels may both be contributing to her symptoms of dizziness and brain fog.       Plan:  Cont nasal regimen  F/u cultures  Check sugars with PCP  RTC in 6 weeks        Bry Garcia MD    [facility]  Rhinology, Endoscopic Skull Base Surgery  [facility]  Department of Otolaryngology - Head & Neck Surgery    Scribe Disclosure:  DANNIE  Michelle Randle, am serving as a scribe to document services personally performed by Bry Garcia MD at this visit, based upon the provider's statements to me. All documentation has been reviewed by the aforementioned provider prior to being entered into the official medical record.     Additional portions of the patient's history have been reviewed below.   ~~~~~~~~~~~~~~~~~~~~~~~~~~~~~~~~~~~~~~~~~~~~~~~~~~~~~~~~~~~~~~~~~~~~~~~~~~~~~~~~~~~~~~~~~~~~~~~~~~~~~~~~~~~~~~~~~~~~~~~~~~~~~~~~~~~~~~~    Past Medical History:   Diagnosis Date     Anxiety      Asthma      Coronary artery disease involving native coronary artery of native heart without angina pectoris      Diabetes mellitus, type 2 (H)      Dyslipidemia      History of coronary angioplasty with insertion of stent      HTN (hypertension)      Obesity         Past Surgical History:   Procedure Laterality Date     ANGIOGRAM  2006    LAD restenosis medical management     CORONARY STENT PLACEMENT  01/01/2001    LAD stenting     GASTRECTOMY LAPAROSCOPIC SLEEVE  07/01/2013     IR MISCELLANEOUS PROCEDURE  11/12/2009     OPTICAL TRACKING SYSTEM ENDOSCOPIC ENDONASAL SURGERY Bilateral 3/1/2022    Procedure: Endoscopic Endonasal Image-Guided Resection of Skull Base Osteoma;  Surgeon: Bry Garcia MD;  Location: [facility], laparoscopic salpingectomy with hysteroscopic polypectomy and novasure endometrial ablation   03/01/2021        Family History   Problem Relation Age of Onset     Anxiety Disorder Mother      Diabetes Mother      Diabetes Father      Hypertension Father      Diabetes Sister      Hypertension Sister      Cerebrovascular Disease Sister      Hypertension Brother      Hypertension Brother      Heart Failure Brother      Anesthesia Reaction No family hx of      Deep Vein Thrombosis (DVT) No family hx of         Social History     Socioeconomic History     Marital status:    Tobacco Use     Smoking status: Light Tobacco Smoker      Smokeless tobacco: Never Used     Tobacco comment: 1-2 puffs 5-7 toimes daily   Substance and Sexual Activity     Alcohol use: Yes     Comment: infrequent     Drug use: Not Currently

## 2022-05-18 NOTE — NURSING NOTE
"Chief Complaint   Patient presents with     RECHECK     1 month follow up    Blood pressure 136/84, pulse 73, temperature 98.7  F (37.1  C), temperature source Temporal, height 1.562 m (5' 1.5\"), weight 78 kg (172 lb), SpO2 100 %, not currently breastfeeding. Uma Cruz, EMT  "

## 2022-05-18 NOTE — PATIENT INSTRUCTIONS
"You were seen in the clinic today by Dr. Garcia. If you have any questions or concerns after your appointment, please call the clinic at [phone]. Press \"1\" for scheduling, press \"3\" for nurse advice.    2.   The following has been recommended for you based upon your appointment today:   -Cultures of your sinuses have been sent to the lab. We will follow up with you on results once Dr. Garcia has had the opportunity to view them.   -Continue your sinus rinses.   -Try to check your blood sugars and keep and eye out for how you are doing.    3.   Plan to return the clinic in 6 weeks.       [facility]se Rollins [facility]  Department of Otolaryngology  [phone]   "

## 2022-05-21 LAB
BACTERIA SPEC CULT: ABNORMAL
BACTERIA SPEC CULT: ABNORMAL

## 2022-05-23 ENCOUNTER — TELEPHONE (OUTPATIENT)
Dept: OTOLARYNGOLOGY | Facility: CLINIC | Age: 42
End: 2022-05-23
Payer: COMMERCIAL

## 2022-05-23 DIAGNOSIS — D16.4 OSTEOMA OF PARANASAL SINUS: ICD-10-CM

## 2022-05-23 DIAGNOSIS — J32.1 CHRONIC FRONTAL SINUSITIS: ICD-10-CM

## 2022-05-23 RX ORDER — SULFAMETHOXAZOLE/TRIMETHOPRIM 800-160 MG
1 TABLET ORAL 2 TIMES DAILY
Qty: 14 TABLET | Refills: 0 | Status: SHIPPED | OUTPATIENT
Start: 2022-05-23

## 2022-05-23 NOTE — RESULT ENCOUNTER NOTE
Markos Saleh,     Can we call Shauna and let her know about x results?  Can we do one more week of bactrim DS BID and  also rx gentamicin rinses?    Also, please remind her to follow her sugars closely!    Thanks,  Miguel

## 2022-05-23 NOTE — TELEPHONE ENCOUNTER
----- Message from Bry Garcia MD sent at 5/23/2022 11:56 AM CDT -----  Hi Therese,     Can we call Shauna and let her know about x results?  Can we do one more week of bactrim DS BID and  also rx gentamicin rinses?    Also, please remind her to follow her sugars closely!    Thanks,  Miguel

## 2022-05-23 NOTE — TELEPHONE ENCOUNTER
Writer called and LM with direct number.     Will send results in detail through [facility].    Therese Rollins RN on 5/23/2022 at 1:23 PM

## 2022-05-25 LAB — BACTERIA SPEC CULT: ABNORMAL

## 2022-08-05 ENCOUNTER — OFFICE VISIT (OUTPATIENT)
Dept: OTOLARYNGOLOGY | Facility: CLINIC | Age: 42
End: 2022-08-05
Payer: COMMERCIAL

## 2022-08-05 VITALS
SYSTOLIC BLOOD PRESSURE: 160 MMHG | BODY MASS INDEX: 30.73 KG/M2 | HEIGHT: 62 IN | HEART RATE: 71 BPM | DIASTOLIC BLOOD PRESSURE: 97 MMHG | TEMPERATURE: 97.5 F | WEIGHT: 167 LBS | OXYGEN SATURATION: 98 %

## 2022-08-05 DIAGNOSIS — J32.1 CHRONIC FRONTAL SINUSITIS: ICD-10-CM

## 2022-08-05 DIAGNOSIS — D16.4 OSTEOMA OF PARANASAL SINUS: Primary | ICD-10-CM

## 2022-08-05 PROCEDURE — 31231 NASAL ENDOSCOPY DX: CPT | Performed by: OTOLARYNGOLOGY

## 2022-08-05 PROCEDURE — 99212 OFFICE O/P EST SF 10 MIN: CPT | Mod: 25 | Performed by: OTOLARYNGOLOGY

## 2022-08-05 ASSESSMENT — PAIN SCALES - GENERAL: PAINLEVEL: NO PAIN (0)

## 2022-08-05 NOTE — LETTER
8/5/2022       RE: Shauna Mobley  [address] [place] [address]     Dear Colleague,    Thank you for referring your patient, Shauna Mobley, to the [facility] [place] at [facility]. Please see a copy of my visit note below.      [facility]  Return Visit  Encounter date:   August 5, 2022    Chief Complaint:   Follow up     ID: s/p endonasal Draf 3 approach for resection of osteoma 3/1/22    Interval History:   Shauna Mobley is a 42 year old female with history of diabetes and CAD who presents for follow up. Shauna initially presented to my care of sinonasal mass - confirmed osteoma that I resected 2 months ago (03/01/2022). Shauna noted some brain-fog and lethargy after surgery. At our visit on 5/18/22, she reported ongoing dizziness, which was improving, as well a continued disorientation.      Today, she reports doing her rinses, but this does not produce much drainage. Her dizziness are improved and only occur occasionally now. Of note, patient is undergoing some stress, especially due her mother's Alzheimer's, and has going to therapy. She keeps a plethora of aquatic     Sino-Nasal Outcome Test (SNOT - 22)  1. Need to Blow Nose: (P) Very mild  2. Nasal Blockage: (P) None  3. Sneezing: (P) None  4. Runny Nose: (P) None  5. Cough: (P) None  6. Post-nasal discharge: (P) None  7. Thick nasal discharge: (P) None  8. Ear fullness: (P) None  9. Dizziness: (P) Very mild  10. Ear Pain: (P) None  11. Facial pain/pressure: (P) None  12. Decreased Sense of Smell/Taste: (P) None  13. Difficulty falling asleep: (P) None  14. Wake up at night: (P) Very mild  15. Lack of a good night's sleep: (P) None, Very mild  16. Wake up tired: (P) Mild or slight  17. Fatigue: (P) Very mild  18. Reduced Productivity: (P) Very mild  19. Reduced Concentration: (P) None  20. Frustrated/restless/irritable: (P) Very mild  21. Sad: (P) None  22.  "Embarrassed: (P) None  Total Score: (P) 8    [place] Operative [facility]  Procedure Date: 03/01/2022     PREOPERATIVE DIAGNOSES:    1.  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  Obstructive right frontal sinusitis, chronic.  3.  Atypical facial pain and dizziness.     POSTOPERATIVE DIAGNOSES:    1.  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  Obstructive right frontal sinusitis, chronic.  3.  Atypical facial pain and dizziness.     PROCEDURE PERFORMED:    1.  Endoscopic endonasal resection of extradural anterior cranial fossa tumor, likely osteoma.    2.  Computerized image guidance, extradural.    Review of systems: A 14-point review of systems has been conducted and is negative for any notable symptoms, except as dictated in the history of present illness.     Physical Exam:  Vital signs: BP (!) 160/97 (BP Location: Right arm, Patient Position: Sitting, Cuff Size: Adult Regular)   Pulse 71   Temp 97.5  F (36.4  C) (Temporal)   Ht 1.562 m (5' 1.5\")   Wt 75.8 kg (167 lb)   SpO2 98%   BMI 31.04 kg/m     General Appearance: No acute distress, appropriate demeanor, conversant  Eyes: moist conjunctivae; EOMI; pupils symmetric; visual acuity grossly intact; no proptosis  Head: normocephalic; overall symmetric appearance without deformity  Face: overall symmetric without deformity; HB I/VI  Nose: No external deformity; see endoscopy  Lungs: symmetric chest rise; no wheezing  CV: Good distal perfusion; normal hear rate  Extremities: No deformity  Neurologic Exam: Cranial nerves II-XII are grossly intact; no focal deficit    Procedure Note  Procedure performed: Rigid nasal endoscopy  Indication: To evaluate for sinonasal pathology not visualized on routine anterior rhinoscopy  Anesthesia: 4% topical lidocaine with 0.05 % oxymetazoline  Description of procedure: A 30 degree, 3 mm rigid endoscope was inserted into bilateral nasal cavities and the nasal valves, nasal cavity, middle meatus, sphenoethmoid " recess, nasopharynx were evaluated for evidence of obstruction, edema, purulence, polyps and/or mass/lesion.     Ronks-Raymond Endoscopic Scoring System  Endoscopic observation Right Left   Polyps in middle meatus (0 = absent, 1 = restricted to middle meatus, 2 = Beyond middle meatus) 0 0   Discharge (0 = absent, 1 = thin and clear, 2 = thick, purulent) 0 0   Edema (0 = absent, 1 = mild-moderate, 2 = moderate-severe) 0 0   Crusting (0 = absent, 1 = mild-moderate, 2 = moderate-severe) 0 0   Scarring (0= absent, 1 = mild-moderate, 2 = moderate-severe) 0 1   Total 0 1     Findings  RT: sinuses clear; no evid of infection or recurrent/residual osteoma  LT: mild scarring at nasofrontal beak region; sinuses clear    The patient tolerated the procedure well without complication.     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 Bilateral endoscopy was performed today, and there are no signs of infection or residual osteoma. As sinuses are stable, I advised her to rinse as needed. I am comfortable with follow up in 6 months.    Bry Garcia MD    [facility]  Rhinology, Endoscopic Skull Base Surgery  [facility]  Department of Otolaryngology - Head & Neck Surgery    Scribe Disclosure:  I, Wendy Jones, am serving as a scribe to document services personally performed by Bry Garcia MD at this visit, based upon the provider's statements to me. All documentation has been reviewed by the aforementioned provider prior to being entered into the official medical record.     Additional portions of the patient's history have been reviewed below.    ~~~~~~~~~~~~~~~~~~~~~~~~~~~~~~~~~~~~~~~~~~~~~~~~~~~~~~~~~~~~~~~~~~~~~~~~~~~~~~~~~~~~~~~~~~~~~~~~~~~~~~~~~~~~~~~~~~~~~~~~~~~~~~~~~~~~~~~    Past Medical History:   Diagnosis Date     Anxiety      Asthma      Coronary artery disease involving native coronary artery of native heart without angina pectoris      Diabetes mellitus, type 2 (H)      Dyslipidemia      History of coronary angioplasty with insertion of stent      HTN (hypertension)      Obesity         Past Surgical History:   Procedure Laterality Date     ANGIOGRAM  2006    LAD restenosis medical management     CORONARY STENT PLACEMENT  01/01/2001    LAD stenting     GASTRECTOMY LAPAROSCOPIC SLEEVE  07/01/2013     IR MISCELLANEOUS PROCEDURE  11/12/2009     OPTICAL TRACKING SYSTEM ENDOSCOPIC ENDONASAL SURGERY Bilateral 3/1/2022    Procedure: Endoscopic Endonasal Image-Guided Resection of Skull Base Osteoma;  Surgeon: Bry Garcia MD;  Location: [facility], laparoscopic salpingectomy with hysteroscopic polypectomy and novasure endometrial ablation   03/01/2021        Family History   Problem Relation Age of Onset     Anxiety Disorder Mother      Diabetes Mother      Diabetes Father      Hypertension Father      Diabetes Sister      Hypertension Sister      Cerebrovascular Disease Sister      Hypertension Brother      Hypertension Brother      Heart Failure Brother      Anesthesia Reaction No family hx of      Deep Vein Thrombosis (DVT) No family hx of         Social History     Socioeconomic History     Marital status:    Tobacco Use     Smoking status: Light Tobacco Smoker     Smokeless tobacco: Never Used     Tobacco comment: 1-2 puffs 5-7 toimes daily   Substance and Sexual Activity     Alcohol use: Yes     Comment: infrequent     Drug use: Not Currently            Again, thank you for allowing me to participate in the care of your patient.      Sincerely,    Bry Garcia MD

## 2022-08-05 NOTE — PATIENT INSTRUCTIONS
"You were seen in the clinic today by Dr. Garcia. If you have any questions or concerns after your appointment, please call the clinic at [phone]. Press \"1\" for scheduling, press \"3\" for nurse advice.    2.   The following has been recommended for you based upon your appointment today:   -Continue sinus rinses.    3.   Plan to return the clinic in 6 months.       Michelle Griffin LPN  [facility]  Department of Otolaryngology  [phone]   "

## 2022-08-05 NOTE — PROGRESS NOTES
[facility]  Return Visit  Encounter date:   August 5, 2022    Chief Complaint:   Follow up     ID: s/p endonasal Draf 3 approach for resection of osteoma 3/1/22    Interval History:   Shauna Mobley is a 42 year old female with history of diabetes and CAD who presents for follow up. Shauna initially presented to my care of sinonasal mass - confirmed osteoma that I resected 2 months ago (03/01/2022). Shauna noted some brain-fog and lethargy after surgery. At our visit on 5/18/22, she reported ongoing dizziness, which was improving, as well a continued disorientation.      Today, she reports doing her rinses, but this does not produce much drainage. Her dizziness are improved and only occur occasionally now. Of note, patient is undergoing some stress, especially due her mother's Alzheimer's, and has going to therapy. She keeps a plethora of aquatic     Sino-Nasal Outcome Test (SNOT - 22)  1. Need to Blow Nose: (P) Very mild  2. Nasal Blockage: (P) None  3. Sneezing: (P) None  4. Runny Nose: (P) None  5. Cough: (P) None  6. Post-nasal discharge: (P) None  7. Thick nasal discharge: (P) None  8. Ear fullness: (P) None  9. Dizziness: (P) Very mild  10. Ear Pain: (P) None  11. Facial pain/pressure: (P) None  12. Decreased Sense of Smell/Taste: (P) None  13. Difficulty falling asleep: (P) None  14. Wake up at night: (P) Very mild  15. Lack of a good night's sleep: (P) None, Very mild  16. Wake up tired: (P) Mild or slight  17. Fatigue: (P) Very mild  18. Reduced Productivity: (P) Very mild  19. Reduced Concentration: (P) None  20. Frustrated/restless/irritable: (P) Very mild  21. Sad: (P) None  22. Embarrassed: (P) None  Total Score: (P) 8    [facility]  Procedure Date: 03/01/2022     PREOPERATIVE DIAGNOSES:    1.  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  Obstructive right frontal sinusitis, chronic.  3.  Atypical facial pain and dizziness.     POSTOPERATIVE DIAGNOSES:    1.  Extradural  "sinonasal tumor, likely osteoma   2.  Obstructive right frontal sinusitis, chronic.  3.  Atypical facial pain and dizziness.     PROCEDURE PERFORMED:    1.  Endoscopic endonasal resection of extradural anterior cranial fossa tumor, likely osteoma.    2.  Computerized image guidance, extradural.    Review of systems: A 14-point review of systems has been conducted and is negative for any notable symptoms, except as dictated in the history of present illness.     Physical Exam:  Vital signs: BP (!) 160/97 (BP Location: Right arm, Patient Position: Sitting, Cuff Size: Adult Regular)   Pulse 71   Temp 97.5  F (36.4  C) (Temporal)   Ht 1.562 m (5' 1.5\")   Wt 75.8 kg (167 lb)   SpO2 98%   BMI 31.04 kg/m     General Appearance: No acute distress, appropriate demeanor, conversant  Eyes: moist conjunctivae; EOMI; pupils symmetric; visual acuity grossly intact; no proptosis  Head: normocephalic; overall symmetric appearance without deformity  Face: overall symmetric without deformity; HB I/VI  Nose: No external deformity; see endoscopy  Lungs: symmetric chest rise; no wheezing  CV: Good distal perfusion; normal hear rate  Extremities: No deformity  Neurologic Exam: Cranial nerves II-XII are grossly intact; no focal deficit    Procedure Note  Procedure performed: Rigid nasal endoscopy  Indication: To evaluate for sinonasal pathology not visualized on routine anterior rhinoscopy  Anesthesia: 4% topical lidocaine with 0.05 % oxymetazoline  Description of procedure: A 30 degree, 3 mm rigid endoscope was inserted into bilateral nasal cavities and the nasal valves, nasal cavity, middle meatus, sphenoethmoid recess, nasopharynx were evaluated for evidence of obstruction, edema, purulence, polyps and/or mass/lesion.     [place]-Raymond Endoscopic Scoring System  Endoscopic observation Right Left   Polyps in middle meatus (0 = absent, 1 = restricted to middle meatus, 2 = Beyond middle meatus) 0 0   Discharge (0 = absent, 1 = thin " and clear, 2 = thick, purulent) 0 0   Edema (0 = absent, 1 = mild-moderate, 2 = moderate-severe) 0 0   Crusting (0 = absent, 1 = mild-moderate, 2 = moderate-severe) 0 0   Scarring (0= absent, 1 = mild-moderate, 2 = moderate-severe) 0 1   Total 0 1     Findings  RT: sinuses clear; no evid of infection or recurrent/residual osteoma  LT: mild scarring at nasofrontal beak region; sinuses clear    The patient tolerated the procedure well without complication.     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 Bilateral endoscopy was performed today, and there are no signs of infection or residual osteoma. As sinuses are stable, I advised her to rinse as needed. I am comfortable with follow up in 6 months.    Bry Garcia MD    [facility]  Rhinology, Endoscopic Skull Base Surgery  [facility]  Department of Otolaryngology - Head & Neck Surgery    Scribe Disclosure:  I, Wendy Jones, am serving as a scribe to document services personally performed by Bry Garcia MD at this visit, based upon the provider's statements to me. All documentation has been reviewed by the aforementioned provider prior to being entered into the official medical record.     Additional portions of the patient's history have been reviewed below.   ~~~~~~~~~~~~~~~~~~~~~~~~~~~~~~~~~~~~~~~~~~~~~~~~~~~~~~~~~~~~~~~~~~~~~~~~~~~~~~~~~~~~~~~~~~~~~~~~~~~~~~~~~~~~~~~~~~~~~~~~~~~~~~~~~~~~~~~    Past Medical History:   Diagnosis Date     Anxiety      Asthma      Coronary artery disease involving native coronary artery of native heart without angina pectoris      Diabetes mellitus, type 2 (H)      Dyslipidemia      History of coronary angioplasty with insertion of stent      HTN (hypertension)       Obesity         Past Surgical History:   Procedure Laterality Date     ANGIOGRAM  2006    LAD restenosis medical management     CORONARY STENT PLACEMENT  01/01/2001    LAD stenting     GASTRECTOMY LAPAROSCOPIC SLEEVE  07/01/2013     IR MISCELLANEOUS PROCEDURE  11/12/2009     OPTICAL TRACKING SYSTEM ENDOSCOPIC ENDONASAL SURGERY Bilateral 3/1/2022    Procedure: Endoscopic Endonasal Image-Guided Resection of Skull Base Osteoma;  Surgeon: Bry Garcia MD;  Location: [facility]     [place], laparoscopic salpingectomy with hysteroscopic polypectomy and novasure endometrial ablation   03/01/2021        Family History   Problem Relation Age of Onset     Anxiety Disorder Mother      Diabetes Mother      Diabetes Father      Hypertension Father      Diabetes Sister      Hypertension Sister      Cerebrovascular Disease Sister      Hypertension Brother      Hypertension Brother      Heart Failure Brother      Anesthesia Reaction No family hx of      Deep Vein Thrombosis (DVT) No family hx of         Social History     Socioeconomic History     Marital status:    Tobacco Use     Smoking status: Light Tobacco Smoker     Smokeless tobacco: Never Used     Tobacco comment: 1-2 puffs 5-7 toimes daily   Substance and Sexual Activity     Alcohol use: Yes     Comment: infrequent     Drug use: Not Currently

## 2022-09-25 ENCOUNTER — HEALTH MAINTENANCE LETTER (OUTPATIENT)
Age: 42
End: 2022-09-25

## 2023-01-30 ENCOUNTER — HEALTH MAINTENANCE LETTER (OUTPATIENT)
Age: 43
End: 2023-01-30

## 2023-02-22 ENCOUNTER — ANCILLARY PROCEDURE (OUTPATIENT)
Dept: CT IMAGING | Facility: CLINIC | Age: 43
End: 2023-02-22
Attending: OTOLARYNGOLOGY
Payer: COMMERCIAL

## 2023-02-22 ENCOUNTER — OFFICE VISIT (OUTPATIENT)
Dept: OTOLARYNGOLOGY | Facility: CLINIC | Age: 43
End: 2023-02-22
Payer: COMMERCIAL

## 2023-02-22 VITALS
OXYGEN SATURATION: 100 % | HEIGHT: 62 IN | HEART RATE: 76 BPM | WEIGHT: 172 LBS | TEMPERATURE: 97.8 F | SYSTOLIC BLOOD PRESSURE: 116 MMHG | DIASTOLIC BLOOD PRESSURE: 74 MMHG | BODY MASS INDEX: 31.65 KG/M2

## 2023-02-22 DIAGNOSIS — G50.1 ATYPICAL FACIAL PAIN: ICD-10-CM

## 2023-02-22 DIAGNOSIS — D16.4 OSTEOMA OF PARANASAL SINUS: ICD-10-CM

## 2023-02-22 DIAGNOSIS — D16.4 OSTEOMA OF PARANASAL SINUS: Primary | ICD-10-CM

## 2023-02-22 DIAGNOSIS — J32.1 CHRONIC FRONTAL SINUSITIS: ICD-10-CM

## 2023-02-22 PROCEDURE — 70486 CT MAXILLOFACIAL W/O DYE: CPT | Mod: GC | Performed by: RADIOLOGY

## 2023-02-22 PROCEDURE — 99213 OFFICE O/P EST LOW 20 MIN: CPT | Mod: 25 | Performed by: OTOLARYNGOLOGY

## 2023-02-22 PROCEDURE — 31231 NASAL ENDOSCOPY DX: CPT | Performed by: OTOLARYNGOLOGY

## 2023-02-22 RX ORDER — AMLODIPINE BESYLATE 5 MG/1
1 TABLET ORAL
COMMUNITY
Start: 2023-01-10

## 2023-02-22 RX ORDER — ATORVASTATIN CALCIUM 20 MG/1
TABLET, FILM COATED ORAL
COMMUNITY
Start: 2022-05-21

## 2023-02-22 ASSESSMENT — PAIN SCALES - GENERAL: PAINLEVEL: NO PAIN (0)

## 2023-02-22 NOTE — LETTER
2/22/2023       RE: Shauna Mobley  [address] [place] [address]     Dear Colleague,    Thank you for referring your patient, Shauna Mobley, to the [facility] [place] [facility]. Please see a copy of my visit note below.      [facility]  Return Visit  Encounter date:   February 22, 2023    Chief Complaint:   Follow-up     ID: s/p endonasal Draf 3 approach for resection of osteoma 3/1/22    Interval History:   Shauna Mobley is a 43 year old female who presents for follow up. At our last visit on 8/5/22 she had continued rinses without significant drainage and improved dizziness so was recommended to continue rinses as needed. She tells me today that she was recently on Augmentin about a month ago for sinus infection. This helped for a couple of days but after completing this course she was still having some sinus pressure and pain. She was then started on doxycycline but she is still having sinus pressure, headaches, and dizziness. Her main concern now has been pressure headaches which feel deeper in her sinuses and does not feel nasal sprays are helping. Her hypertension is now under control but she is still working on managing her diabetes.     Her mother recently passed away and she was hospitalized for a stroke in the Fall so she has been under a lot of stress.     Sino-Nasal Outcome Test (SNOT - 22)  1. Need to Blow Nose: (P) None  2. Nasal Blockage: (P) Very mild  3. Sneezing: (P) Very mild  4. Runny Nose: (P) Very mild  5. Cough: (P) Very mild  6. Post-nasal discharge: (P) None  7. Thick nasal discharge: (P) None  8. Ear fullness: (P) None  9. Dizziness: (P) Moderate  10. Ear Pain: (P) None  11. Facial pain/pressure: (P) Moderate  12. Decreased Sense of Smell/Taste: (P) Very mild  13. Difficulty falling asleep: (P) Moderate  14. Wake up at night: (P) Moderate, Severe  15. Lack of a good night's sleep: (P)  "Moderate  16. Wake up tired: (P) Moderate  17. Fatigue: (P) Moderate  18. Reduced Productivity: (P) Moderate  19. Reduced Concentration: (P) Mild or slight  20. Frustrated/restless/irritable: (P) Mild or slight  21. Sad: (P) Severe  22. Embarrassed: (P) None  Total Score: (P) 37    [place] [facility]  Procedure Date: 03/01/2022     PREOPERATIVE DIAGNOSES:    1.  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  Obstructive right frontal sinusitis, chronic.  3.  Atypical facial pain and dizziness.     POSTOPERATIVE DIAGNOSES:    1.  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  Obstructive right frontal sinusitis, chronic.  3.  Atypical facial pain and dizziness.     PROCEDURE PERFORMED:    1.  Endoscopic endonasal resection of extradural anterior cranial fossa tumor, likely osteoma.    2.  Computerized image guidance, extradural.    Review of systems: A 14-point review of systems has been conducted and is negative for any notable symptoms, except as dictated in the history of present illness.     Physical Exam:  Vital signs: /74 (BP Location: Left arm, Patient Position: Sitting, Cuff Size: Adult Regular)   Pulse 76   Temp 97.8  F (36.6  C)   Ht 1.562 m (5' 1.5\")   Wt 78 kg (172 lb)   SpO2 100%   BMI 31.97 kg/m     General Appearance: No acute distress, appropriate demeanor, conversant  Eyes: moist conjunctivae; EOMI; pupils symmetric; visual acuity grossly intact; no proptosis  Head: normocephalic; overall symmetric appearance without deformity  Face: overall symmetric without deformity; HB I/VI  Nose: No external deformity; see endoscopy  Lungs: symmetric chest rise; no wheezing  CV: Good distal perfusion; normal hear rate  Extremities: No deformity  Neurologic Exam: Cranial nerves II-XII are grossly intact; no focal deficit     Procedure Note  Procedure performed: Rigid nasal endoscopy  Indication: To evaluate for sinonasal pathology not visualized on routine anterior rhinoscopy  Anesthesia: " 4% topical lidocaine with 0.05 % oxymetazoline  Description of procedure: A 30 degree, 3 mm rigid endoscope was inserted into bilateral nasal cavities and the nasal valves, nasal cavity, middle meatus, sphenoethmoid recess, nasopharynx were evaluated for evidence of obstruction, edema, purulence, polyps and/or mass/lesion.     [place]-Raymond Endoscopic Scoring System  Endoscopic observation Right Left   Polyps in middle meatus (0 = absent, 1 = restricted to middle meatus, 2 = Beyond middle meatus) 0 0   Discharge (0 = absent, 1 = thin and clear, 2 = thick, purulent) 0 0   Edema (0 = absent, 1 = mild-moderate, 2 = moderate-severe) 0 0   Crusting (0 = absent, 1 = mild-moderate, 2 = moderate-severe) 0 0   Scarring (0= absent, 1 = mild-moderate, 2 = moderate-severe) 0 0   Total 0 0     Findings  RT: sinuses clear with small amount of drainage; no signs of infection   LT: sinuses clear; no signs of infection     The patient tolerated the procedure well without complication.     Laboratory Review:  N/A     Imaging Review:  CT head 1/11/23  IMPRESSION:   1.  No acute intracranial process.   2.  Chronic infarction right anterior thalamus.    MR B 11/10/22  IMPRESSION:   1.  1 cm acute/early subacute infarct in the anteromedial right thalamus. No significant mass effect.   2.  Questionable punctate focus of acute/early subacute cerebral infarction in the left corona radiata.   3.  Evidence of chronic lacunar infarcts in the right basal and left external capsule.   4.  Evidence of mild chronic small vessel ischemic change in the supratentorial white matter.     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     Plan:  Obtain updated CT scan   RTC pending CT  results    Bry Garcia MD    [facility]  Rhinology, Endoscopic Skull Base Surgery  [facility]  Department of Otolaryngology - Head & Neck Surgery    Scribe Disclosure:  I, Ania Shane, am serving as a scribe to document services personally performed by Bry Garcia MD at this visit, based upon the provider's statements to me. All documentation has been reviewed by the aforementioned provider prior to being entered into the official medical record.     Additional portions of the patient's history have been reviewed below.   ~~~~~~~~~~~~~~~~~~~~~~~~~~~~~~~~~~~~~~~~~~~~~~~~~~~~~~~~~~~~~~~~~~~~~~~~~~~~~~~~~~~~~~~~~~~~~~~~~~~~~~~~~~~~~~~~~~~~~~~~~~~~~~~~~~~~~~~    Past Medical History:   Diagnosis Date     Anxiety      Asthma      Coronary artery disease involving native coronary artery of native heart without angina pectoris      Diabetes mellitus, type 2 (H)      Dyslipidemia      History of coronary angioplasty with insertion of stent      HTN (hypertension)      Obesity         Past Surgical History:   Procedure Laterality Date     ANGIOGRAM  2006    LAD restenosis medical management     CORONARY STENT PLACEMENT  01/01/2001    LAD stenting     GASTRECTOMY LAPAROSCOPIC SLEEVE  07/01/2013     IR MISCELLANEOUS PROCEDURE  11/12/2009     OPTICAL TRACKING SYSTEM ENDOSCOPIC ENDONASAL SURGERY Bilateral 3/1/2022    Procedure: Endoscopic Endonasal Image-Guided Resection of Skull Base Osteoma;  Surgeon: Bry Garcia MD;  Location: [facility], laparoscopic salpingectomy with hysteroscopic polypectomy and novasure endometrial ablation   03/01/2021        Family History   Problem Relation Age of Onset     Anxiety Disorder Mother      Diabetes Mother      Diabetes Father      Hypertension Father      Diabetes Sister      Hypertension Sister      Cerebrovascular Disease Sister      Hypertension Brother      Hypertension Brother      Heart Failure Brother       Anesthesia Reaction No family hx of      Deep Vein Thrombosis (DVT) No family hx of         Social History     Socioeconomic History     Marital status:      Spouse name: None     Number of children: None     Years of education: None     Highest education level: None   Tobacco Use     Smoking status: Light Smoker     Smokeless tobacco: Never     Tobacco comments:     1-2 puffs 5-7 toimes daily   Substance and Sexual Activity     Alcohol use: Yes     Comment: infrequent     Drug use: Not Currently           Again, thank you for allowing me to participate in the care of your patient.      Sincerely,    Bry Garcia MD

## 2023-02-22 NOTE — PROGRESS NOTES
[facility]  Return Visit  Encounter date:   February 22, 2023    Chief Complaint:   Follow-up     ID: s/p endonasal Draf 3 approach for resection of osteoma 3/1/22    Interval History:   Shauna Mobley is a 43 year old female who presents for follow up. At our last visit on 8/5/22 she had continued rinses without significant drainage and improved dizziness so was recommended to continue rinses as needed. She tells me today that she was recently on Augmentin about a month ago for sinus infection. This helped for a couple of days but after completing this course she was still having some sinus pressure and pain. She was then started on doxycycline but she is still having sinus pressure, headaches, and dizziness. Her main concern now has been pressure headaches which feel deeper in her sinuses and does not feel nasal sprays are helping. Her hypertension is now under control but she is still working on managing her diabetes.     Her mother recently passed away and she was hospitalized for a stroke in the Fall so she has been under a lot of stress.     Sino-Nasal Outcome Test (SNOT - 22)  1. Need to Blow Nose: (P) None  2. Nasal Blockage: (P) Very mild  3. Sneezing: (P) Very mild  4. Runny Nose: (P) Very mild  5. Cough: (P) Very mild  6. Post-nasal discharge: (P) None  7. Thick nasal discharge: (P) None  8. Ear fullness: (P) None  9. Dizziness: (P) Moderate  10. Ear Pain: (P) None  11. Facial pain/pressure: (P) Moderate  12. Decreased Sense of Smell/Taste: (P) Very mild  13. Difficulty falling asleep: (P) Moderate  14. Wake up at night: (P) Moderate, Severe  15. Lack of a good night's sleep: (P) Moderate  16. Wake up tired: (P) Moderate  17. Fatigue: (P) Moderate  18. Reduced Productivity: (P) Moderate  19. Reduced Concentration: (P) Mild or slight  20. Frustrated/restless/irritable: (P) Mild or slight  21. Sad: (P) Severe  22. Embarrassed: (P) None  Total Score: (P) 37    [facility]  "History  Procedure Date: 03/01/2022     PREOPERATIVE DIAGNOSES:    1.  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  Obstructive right frontal sinusitis, chronic.  3.  Atypical facial pain and dizziness.     POSTOPERATIVE DIAGNOSES:    1.  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  Obstructive right frontal sinusitis, chronic.  3.  Atypical facial pain and dizziness.     PROCEDURE PERFORMED:    1.  Endoscopic endonasal resection of extradural anterior cranial fossa tumor, likely osteoma.    2.  Computerized image guidance, extradural.    Review of systems: A 14-point review of systems has been conducted and is negative for any notable symptoms, except as dictated in the history of present illness.     Physical Exam:  Vital signs: /74 (BP Location: Left arm, Patient Position: Sitting, Cuff Size: Adult Regular)   Pulse 76   Temp 97.8  F (36.6  C)   Ht 1.562 m (5' 1.5\")   Wt 78 kg (172 lb)   SpO2 100%   BMI 31.97 kg/m     General Appearance: No acute distress, appropriate demeanor, conversant  Eyes: moist conjunctivae; EOMI; pupils symmetric; visual acuity grossly intact; no proptosis  Head: normocephalic; overall symmetric appearance without deformity  Face: overall symmetric without deformity; HB I/VI  Nose: No external deformity; see endoscopy  Lungs: symmetric chest rise; no wheezing  CV: Good distal perfusion; normal hear rate  Extremities: No deformity  Neurologic Exam: Cranial nerves II-XII are grossly intact; no focal deficit     Procedure Note  Procedure performed: Rigid nasal endoscopy  Indication: To evaluate for sinonasal pathology not visualized on routine anterior rhinoscopy  Anesthesia: 4% topical lidocaine with 0.05 % oxymetazoline  Description of procedure: A 30 degree, 3 mm rigid endoscope was inserted into bilateral nasal cavities and the nasal valves, nasal cavity, middle meatus, sphenoethmoid recess, nasopharynx were evaluated for evidence of obstruction, edema, purulence, " polyps and/or mass/lesion.     [place]-Raymond Endoscopic Scoring System  Endoscopic observation Right Left   Polyps in middle meatus (0 = absent, 1 = restricted to middle meatus, 2 = Beyond middle meatus) 0 0   Discharge (0 = absent, 1 = thin and clear, 2 = thick, purulent) 0 0   Edema (0 = absent, 1 = mild-moderate, 2 = moderate-severe) 0 0   Crusting (0 = absent, 1 = mild-moderate, 2 = moderate-severe) 0 0   Scarring (0= absent, 1 = mild-moderate, 2 = moderate-severe) 0 0   Total 0 0     Findings  RT: sinuses clear with small amount of drainage; no signs of infection   LT: sinuses clear; no signs of infection     The patient tolerated the procedure well without complication.     Laboratory Review:  N/A     Imaging Review:  CT head 1/11/23  IMPRESSION:   1.  No acute intracranial process.   2.  Chronic infarction right anterior thalamus.    MR B 11/10/22  IMPRESSION:   1.  1 cm acute/early subacute infarct in the anteromedial right thalamus. No significant mass effect.   2.  Questionable punctate focus of acute/early subacute cerebral infarction in the left corona radiata.   3.  Evidence of chronic lacunar infarcts in the right basal and left external capsule.   4.  Evidence of mild chronic small vessel ischemic change in the supratentorial white matter.     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     Plan:  Obtain updated CT scan   RTC pending CT results    Bry Garcia MD    [facility]  Rhinology, Endoscopic Skull Base Surgery  [facility]  Department of Otolaryngology - Head & Neck Surgery    Scribe Disclosure:  I, Ania Shane, am serving as a scribe to document services personally performed by  Bry Garcia MD at this visit, based upon the provider's statements to me. All documentation has been reviewed by the aforementioned provider prior to being entered into the official medical record.     Additional portions of the patient's history have been reviewed below.   ~~~~~~~~~~~~~~~~~~~~~~~~~~~~~~~~~~~~~~~~~~~~~~~~~~~~~~~~~~~~~~~~~~~~~~~~~~~~~~~~~~~~~~~~~~~~~~~~~~~~~~~~~~~~~~~~~~~~~~~~~~~~~~~~~~~~~~~    Past Medical History:   Diagnosis Date     Anxiety      Asthma      Coronary artery disease involving native coronary artery of native heart without angina pectoris      Diabetes mellitus, type 2 (H)      Dyslipidemia      History of coronary angioplasty with insertion of stent      HTN (hypertension)      Obesity         Past Surgical History:   Procedure Laterality Date     ANGIOGRAM  2006    LAD restenosis medical management     CORONARY STENT PLACEMENT  01/01/2001    LAD stenting     GASTRECTOMY LAPAROSCOPIC SLEEVE  07/01/2013     IR MISCELLANEOUS PROCEDURE  11/12/2009     OPTICAL TRACKING SYSTEM ENDOSCOPIC ENDONASAL SURGERY Bilateral 3/1/2022    Procedure: Endoscopic Endonasal Image-Guided Resection of Skull Base Osteoma;  Surgeon: Bry Garcia MD;  Location: [facility], laparoscopic salpingectomy with hysteroscopic polypectomy and novasure endometrial ablation   03/01/2021        Family History   Problem Relation Age of Onset     Anxiety Disorder Mother      Diabetes Mother      Diabetes Father      Hypertension Father      Diabetes Sister      Hypertension Sister      Cerebrovascular Disease Sister      Hypertension Brother      Hypertension Brother      Heart Failure Brother      Anesthesia Reaction No family hx of      Deep Vein Thrombosis (DVT) No family hx of         Social History     Socioeconomic History     Marital status:      Spouse name: None     Number of children: None     Years of education: None     Highest education level: None   Tobacco Use     Smoking status:  Light Smoker     Smokeless tobacco: Never     Tobacco comments:     1-2 puffs 5-7 toimes daily   Substance and Sexual Activity     Alcohol use: Yes     Comment: infrequent     Drug use: Not Currently

## 2023-02-23 ENCOUNTER — TELEPHONE (OUTPATIENT)
Dept: OTOLARYNGOLOGY | Facility: CLINIC | Age: 43
End: 2023-02-23
Payer: COMMERCIAL

## 2023-02-23 NOTE — TELEPHONE ENCOUNTER
I called Shauna about her CT results.  Sinuses are clear and there is no evidence of recurrent osteoma. We discussed potential causes of her symptoms, including both migraine or some type of atypical facial pain. She will work with PCP to try and unravel triggers of headaches to try and get them under better control.      Bry Garcia MD    [facility]  [facility]  [facility]  Department of Otolaryngology - Head & Neck Surgery

## 2023-08-05 ENCOUNTER — HEALTH MAINTENANCE LETTER (OUTPATIENT)
Age: 43
End: 2023-08-05

## 2024-03-02 ENCOUNTER — HEALTH MAINTENANCE LETTER (OUTPATIENT)
Age: 44
End: 2024-03-02

## 2024-09-28 ENCOUNTER — HEALTH MAINTENANCE LETTER (OUTPATIENT)
Age: 44
End: 2024-09-28

## 2025-03-15 ENCOUNTER — HEALTH MAINTENANCE LETTER (OUTPATIENT)
Age: 45
End: 2025-03-15

## 2025-04-05 ENCOUNTER — HEALTH MAINTENANCE LETTER (OUTPATIENT)
Age: 45
End: 2025-04-05

## (undated) DEVICE — ESU BIPOLAR SEALER AQUAMANTYS MIS FLEX 23-321-1

## (undated) DEVICE — ESU ELEC NDL 6" COATED/INSULATED E1465-6

## (undated) DEVICE — CATH TRAY FOLEY SURESTEP 16FR W/TMP PRB STLK LATEX A319416AM

## (undated) DEVICE — BLADE SHAVER SINUS 3.5MM RAD 60 ROTATE 1883516HRE

## (undated) DEVICE — SOL WATER IRRIG 1000ML BOTTLE 2F7114

## (undated) DEVICE — SUCTION MANIFOLD NEPTUNE 2 SYS 4 PORT 0702-020-000

## (undated) DEVICE — LINEN TOWEL PACK X30 5481

## (undated) DEVICE — ENDO SHEATH STORZ SHARPSITE ENDOSCRUB 0DEG 4MM 1912000

## (undated) DEVICE — BUR 30K TAPER CHOANAL ATRESIA 1884015RTD

## (undated) DEVICE — SHEETING SILASTIC .015 REINFORCED 4X6" STERILE

## (undated) DEVICE — BLADE SHAVER SERRATED 4MM ROTATE 1884002HRE

## (undated) DEVICE — DRAPE WARMER 66X44" ORS-300

## (undated) DEVICE — COVER CAMERA VIDEO LASER 9X96" 04-CC227

## (undated) DEVICE — SYR 03ML LL W/O NDL 309657

## (undated) DEVICE — SOL NACL 0.9% INJ 1000ML BAG 07983-09

## (undated) DEVICE — ESU GROUND PAD ADULT W/CORD E7507

## (undated) DEVICE — TRACKER PATIENT NON-INVASIVE AXIEM 9734887

## (undated) DEVICE — Device

## (undated) DEVICE — BUR 30K DIAMOND 70DG 3MM 13CM 1883070BLD

## (undated) DEVICE — ENDO SHEATH STORZ SHARPSITE ENDOSCRUB 30DEG 4MM 1912010

## (undated) DEVICE — TRACKER ENT OTS INSTRUMENT FUSION 9733533

## (undated) DEVICE — LINEN TOWEL PACK X6 WHITE 5487

## (undated) DEVICE — DRSG NASOPORE FRAG FIRM 8CM 5400-020-008

## (undated) DEVICE — SYR 30ML LL W/O NDL 302832

## (undated) DEVICE — DRAPE POUCH INSTRUMENT 1018

## (undated) DEVICE — PACK NEURO MINOR UMMC SNE32MNMU4

## (undated) DEVICE — SPONGE COTTONOID 1/2X3" 80-1407

## (undated) RX ORDER — LABETALOL HYDROCHLORIDE 5 MG/ML
INJECTION, SOLUTION INTRAVENOUS
Status: DISPENSED
Start: 2022-03-01

## (undated) RX ORDER — EPHEDRINE SULFATE 50 MG/ML
INJECTION, SOLUTION INTRAMUSCULAR; INTRAVENOUS; SUBCUTANEOUS
Status: DISPENSED
Start: 2022-03-01

## (undated) RX ORDER — HYDRALAZINE HYDROCHLORIDE 20 MG/ML
INJECTION INTRAMUSCULAR; INTRAVENOUS
Status: DISPENSED
Start: 2022-03-01

## (undated) RX ORDER — FENTANYL CITRATE 50 UG/ML
INJECTION, SOLUTION INTRAMUSCULAR; INTRAVENOUS
Status: DISPENSED
Start: 2022-03-01

## (undated) RX ORDER — CEFTRIAXONE 2 G/1
INJECTION, POWDER, FOR SOLUTION INTRAMUSCULAR; INTRAVENOUS
Status: DISPENSED
Start: 2022-03-01

## (undated) RX ORDER — ACETAMINOPHEN 325 MG/1
TABLET ORAL
Status: DISPENSED
Start: 2022-03-01

## (undated) RX ORDER — ONDANSETRON 2 MG/ML
INJECTION INTRAMUSCULAR; INTRAVENOUS
Status: DISPENSED
Start: 2022-03-01

## (undated) RX ORDER — REGADENOSON 0.08 MG/ML
INJECTION, SOLUTION INTRAVENOUS
Status: DISPENSED
Start: 2022-01-19

## (undated) RX ORDER — OXYMETAZOLINE HYDROCHLORIDE 0.05 G/100ML
SPRAY NASAL
Status: DISPENSED
Start: 2022-03-01

## (undated) RX ORDER — LIDOCAINE HYDROCHLORIDE AND EPINEPHRINE 10; 10 MG/ML; UG/ML
INJECTION, SOLUTION INFILTRATION; PERINEURAL
Status: DISPENSED
Start: 2022-03-01